# Patient Record
Sex: MALE | Race: AMERICAN INDIAN OR ALASKA NATIVE | URBAN - METROPOLITAN AREA
[De-identification: names, ages, dates, MRNs, and addresses within clinical notes are randomized per-mention and may not be internally consistent; named-entity substitution may affect disease eponyms.]

---

## 2017-02-02 ENCOUNTER — OFFICE VISIT (OUTPATIENT)
Dept: FAMILY MEDICINE CLINIC | Age: 57
End: 2017-02-02

## 2017-02-02 ENCOUNTER — HOSPITAL ENCOUNTER (OUTPATIENT)
Dept: LAB | Age: 57
Discharge: HOME | End: 2017-02-02

## 2017-02-02 ENCOUNTER — TELEPHONE (OUTPATIENT)
Dept: FAMILY MEDICINE CLINIC | Age: 57
End: 2017-02-02

## 2017-02-02 VITALS
BODY MASS INDEX: 18.61 KG/M2 | RESPIRATION RATE: 18 BRPM | TEMPERATURE: 96.8 F | SYSTOLIC BLOOD PRESSURE: 132 MMHG | OXYGEN SATURATION: 98 % | WEIGHT: 105 LBS | HEART RATE: 64 BPM | DIASTOLIC BLOOD PRESSURE: 83 MMHG | HEIGHT: 63 IN

## 2017-02-02 DIAGNOSIS — G89.29 CHRONIC LEFT-SIDED LOW BACK PAIN WITH LEFT-SIDED SCIATICA: ICD-10-CM

## 2017-02-02 DIAGNOSIS — R20.2 NUMBNESS AND TINGLING IN LEFT ARM: ICD-10-CM

## 2017-02-02 DIAGNOSIS — M54.10 RADICULOPATHY OF LEG: ICD-10-CM

## 2017-02-02 DIAGNOSIS — M79.642 LEFT HAND PAIN: ICD-10-CM

## 2017-02-02 DIAGNOSIS — R20.2 PARESTHESIA OF LEFT ARM: Primary | ICD-10-CM

## 2017-02-02 DIAGNOSIS — G25.2 INTENTION TREMOR: ICD-10-CM

## 2017-02-02 DIAGNOSIS — R63.4 WEIGHT LOSS, UNINTENTIONAL: ICD-10-CM

## 2017-02-02 DIAGNOSIS — M54.42 CHRONIC LEFT-SIDED LOW BACK PAIN WITH LEFT-SIDED SCIATICA: ICD-10-CM

## 2017-02-02 DIAGNOSIS — R20.0 NUMBNESS AND TINGLING IN LEFT ARM: ICD-10-CM

## 2017-02-02 DIAGNOSIS — Z11.59 NEED FOR HEPATITIS C SCREENING TEST: ICD-10-CM

## 2017-02-02 DIAGNOSIS — M62.81 MUSCLE WEAKNESS (GENERALIZED): ICD-10-CM

## 2017-02-02 PROCEDURE — 99001 SPECIMEN HANDLING PT-LAB: CPT | Performed by: FAMILY MEDICINE

## 2017-02-02 RX ORDER — BUDESONIDE AND FORMOTEROL FUMARATE DIHYDRATE 160; 4.5 UG/1; UG/1
1 AEROSOL RESPIRATORY (INHALATION) DAILY
Refills: 1 | COMMUNITY
Start: 2016-12-27 | End: 2017-05-09 | Stop reason: SDUPTHER

## 2017-02-02 RX ORDER — GABAPENTIN 300 MG/1
300 CAPSULE ORAL 3 TIMES DAILY
Qty: 90 CAP | Refills: 2 | Status: SHIPPED | OUTPATIENT
Start: 2017-02-02 | End: 2017-02-09 | Stop reason: SINTOL

## 2017-02-02 RX ORDER — CLONIDINE HYDROCHLORIDE 0.2 MG/1
1 TABLET ORAL DAILY
Refills: 0 | COMMUNITY
Start: 2017-01-06 | End: 2017-06-03 | Stop reason: SDUPTHER

## 2017-02-02 RX ORDER — ALBUTEROL SULFATE 90 UG/1
1-2 AEROSOL, METERED RESPIRATORY (INHALATION)
Refills: 1 | COMMUNITY
Start: 2017-01-25 | End: 2017-05-09

## 2017-02-02 RX ORDER — CARISOPRODOL 250 MG/1
250 TABLET ORAL
Qty: 90 TAB | Refills: 2 | Status: SHIPPED | OUTPATIENT
Start: 2017-02-02 | End: 2017-02-06 | Stop reason: CLARIF

## 2017-02-02 NOTE — TELEPHONE ENCOUNTER
Called pharmacy to see what was going on they stated it was not gabapentin it was the carisoprodol (SOMA) 250 mg tablet that insurance will not cover. Received fax for prior auth put in box.
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
## 2017-02-02 NOTE — TELEPHONE ENCOUNTER
Pt called stating he took his Gabapentin rx to the pharmacy and was told this insurance denied it. Pt called his insurance ([employer]) to find out why. Insurance told the pt that if the doctor calls them ([phone]) they will approve it.

## 2017-02-02 NOTE — MR AVS SNAPSHOT
Visit Information Date & Time Provider Department Dept. Phone Encounter #  
 2/2/2017  7:00 AM Bruna Ty, 3 [facility] [phone] [id number] Follow-up Instructions Return in about 4 weeks (around 3/2/2017) for 30min sports med f/u. Upcoming Health Maintenance Date Due Hepatitis C Screening 1960 FOBT Q 1 YEAR AGE 50-75 9/28/2008 INFLUENZA AGE 9 TO ADULT 8/1/2016 DTaP/Tdap/Td series (2 - Td) 11/21/2025 Allergies as of 2/2/2017  Review Complete On: 2/2/2017 By: Bruna Ty MD  
 No Known Allergies Current Immunizations  Reviewed on 2/2/2017 Name Date Influenza Vaccine 12/14/2014 Pneumococcal Polysaccharide (PPSV-23) 1/8/2015 Tdap 11/21/2015 Reviewed by Pavithra Parkinson LPN on 6/0/6181 at  6:53 AM  
You Were Diagnosed With   
  
 Codes Comments Muscle weakness (generalized)    -  Primary ICD-10-CM: M62.81 ICD-9-CM: 728.87 Paresthesia of left arm     ICD-10-CM: R20.2 ICD-9-CM: 782.0 Radiculopathy of leg     ICD-10-CM: M54.10 ICD-9-CM: 724.4 Weight loss, unintentional     ICD-10-CM: R63.4 ICD-9-CM: 783.21 Chronic left-sided low back pain with left-sided sciatica     ICD-10-CM: M54.42, G89.29 ICD-9-CM: 724.2, 724.3, 338.29 Left hand pain     ICD-10-CM: Z46.339 ICD-9-CM: 729.5 Intention tremor     ICD-10-CM: G25.2 ICD-9-CM: 333.1 Need for hepatitis C screening test     ICD-10-CM: Z11.59 
ICD-9-CM: V73.89 Vitals BP Pulse Temp Resp Height(growth percentile) Weight(growth percentile) 132/83 (BP 1 Location: Right arm, BP Patient Position: Sitting) 64 96.8 °F (36 °C) (Oral) 18 5' 3\" (1.6 m) 105 lb (47.6 kg) SpO2 BMI Smoking Status 98% 18.6 kg/m2 Former Smoker Vitals History BMI and BSA Data Body Mass Index Body Surface Area  
 18.6 kg/m 2 1.45 m 2 Preferred Pharmacy Pharmacy Name Phone [facility] [address] [phone] Your Updated Medication List  
  
   
This list is accurate as of: 2/2/17  8:21 AM.  Always use your most recent med list.  
  
  
  
  
 carisoprodol 250 mg tablet Commonly known as:  SOMA Take 1 Tab by mouth every eight (8) hours as needed for Muscle Spasm(s). Max Daily Amount: 750 mg.  
  
 cloNIDine HCl 0.2 mg tablet Commonly known as:  CATAPRES Take 1 Tab by mouth two (2) times a day.  
  
 gabapentin 300 mg capsule Commonly known as:  NEURONTIN Take 1 Cap by mouth three (3) times daily. SYMBICORT 160-4.5 mcg/actuation HFA inhaler Generic drug:  budesonide-formoterol Take 1 Puff by inhalation daily. VENTOLIN HFA 90 mcg/actuation inhaler Generic drug:  albuterol Take 1-2 Puffs by inhalation every four (4) hours as needed. Prescriptions Printed Refills  
 carisoprodol (SOMA) 250 mg tablet 2 Sig: Take 1 Tab by mouth every eight (8) hours as needed for Muscle Spasm(s). Max Daily Amount: 750 mg.  
 Class: Print Route: Oral  
  
Prescriptions Sent to Pharmacy Refills  
 gabapentin (NEURONTIN) 300 mg capsule 2 Sig: Take 1 Cap by mouth three (3) times daily. Class: Normal  
 Pharmacy: [facility] [address] [address]  #: [phone] Route: Oral  
  
Follow-up Instructions Return in about 4 weeks (around 3/2/2017) for 30min sports med f/u. To-Do List   
 02/02/2017 Lab:  AMMONIA   
  
 02/02/2017 Lab:  CBC WITH AUTOMATED DIFF   
  
 02/02/2017 Lab:  FERRITIN   
  
 02/02/2017 Lab:  HEAVY METALS PROFILE, UR   
  
 02/02/2017 Lab:  HEPATITIS C AB   
  
 02/02/2017 Lab:  HEPATITIS C AB   
  
 02/02/2017 Lab:  HIV 1/2 AG/AB, 4TH GENERATION,W RFLX CONFIRM   
  
 02/02/2017 Lab:  IRON PROFILE   
  
 02/02/2017   Lab:  METABOLIC PANEL, COMPREHENSIVE   
  
 02/02/2017 Imaging:  MRI CERV SPINE WO CONT   
  
 02/02/2017 Imaging:  MRI LUMB SPINE WO CONT   
  
 02/02/2017 Lab:  PREALBUMIN   
  
 02/02/2017 Lab:  T PALLIDUM AB   
  
 02/02/2017 Lab:  T4, FREE   
  
 02/02/2017 Lab:  TSH 3RD GENERATION   
  
 02/02/2017 Lab:  VITAMIN B12 & FOLATE   
  
 02/02/2017 Lab:  VITAMIN D, 25 HYDROXY   
  
 02/02/2017 Imaging:  XR HAND LT AP/LAT Patient Instructions To Do (in the office): · Stop by the  on your way out · Tell them you need to establish care with a provider for primary care · Tell them you need to get bloodwork today as well To Do (after the office): · Bring your hand x-ray order to any [facility] facility to get your hand xray done · Restart your Merrianne Bulmaro (carisoprodol) 3 times a day as needed for muscle spasms · Start gabapentin (neurontin) 300mg at night for the nerve pain; then slowly increase to gabapentin 300mg 3 times a day Notes from your doctor: - I am checking bloodwork to see what can be causing your nerve pain, tremors - [facility] central scheduling should call you within the 3-5 days to schedule your neck and low back MRI. However, if you have not received a phone call from them in 3 business days, call them @ [phone].   
- at some point, depending on your results, you may need a referral to a neurologist for nerve studies - my goal is to find the reason why you are in so much pain, and to find the appropriate long-term treatment for your pain - please remember that I am NOT a pain management doctor, so I do not prescribe long-term narcotic medication. .. But narcotic pain medication only covers up the pain anyway. .. It is NOT the long-term solution. Abnormal Weight Loss: Care Instructions Your Care Instructions There are two types of weight lossnormal and abnormal. The normal kind happens when you are trying to lose weight by exercising more or eating less. The abnormal kind happens when you are not trying to lose weight. Many medical problems can cause abnormal weight loss. These include problems with your thyroid gland, long-term infections, mouth or throat problems that make it hard to eat, and digestive problems. They also include depression and cancer. Some medicines also may cause you to lose weight. You can work with your doctor to find the cause of your weight loss. You will probably need tests to do this. Follow-up care is a key part of your treatment and safety. Be sure to make and go to all appointments, and call your doctor if you are having problems. It's also a good idea to know your test results and keep a list of the medicines you take. How can you care for yourself at home? · Weigh yourself at the same time every day. It's best to do it first thing in the morning after you empty your bladder. Be sure to always wear the same amount of clothing. · Write down any changes in your weight and the possible causes. Discuss these with your doctor. · Your doctor may want you to change your diet. Do your best to follow his or her advice. · Ask your doctor if you should see a dietitian. This is a person who can help you plan meals that work best for your lifestyle. · Note any changes in bowel habits. These may include changes in how often you have a bowel movement. Other changes include the color and size of your stools and how solid they are. · If you are prescribed medicines, take them exactly as prescribed. Call your doctor if you think you are having a problem with your medicine. You will get more details on the specific medicines your doctor prescribes. When should you call for help? Watch closely for changes in your health, and be sure to contact your doctor if: 
· You do not get better as expected. · You continue to lose weight. Where can you learn more? Go to http://eileen-crystal.info/. Enter A790 in the search box to learn more about \"Abnormal Weight Loss: Care Instructions. \" Current as of: February 16, 2016 Content Version: 11.1 © 6841-7771 [employer]. Care instructions adapted under license by [employer] (which disclaims liability or warranty for this information). If you have questions about a medical condition or this instruction, always ask your healthcare professional. [address] any warranty or liability for your use of this information. Introducing [facility]! Shilpa Singleton introduces Thoora patient portal. Now you can access parts of your medical record, email your doctor's office, and request medication refills online. 1. In your internet browser, go to [email or web address] 2. Click on the First Time User? Click Here link in the Sign In box. You will see the New Member Sign Up page. 3. Enter your Thoora Access Code exactly as it appears below. You will not need to use this code after youve completed the sign-up process. If you do not sign up before the expiration date, you must request a new code. · Thoora Access Code: [email or web address] Expires: 5/3/2017  7:44 AM 
 
4. Enter the last four digits of your Social Security Number (xxxx) and Date of Birth (mm/dd/yyyy) as indicated and click Submit. You will be taken to the next sign-up page. 5. Create a Thoora ID. This will be your Thoora login ID and cannot be changed, so think of one that is secure and easy to remember. 6. Create a Thoora password. You can change your password at any time. 7. Enter your Password Reset Question and Answer. This can be used at a later time if you forget your password. 8. Enter your e-mail address. You will receive e-mail notification when new information is available in [address] 9. Click Sign Up. You can now view and download portions of your medical record. 10. Click the Download Summary menu link to download a portable copy of your medical information. If you have questions, please visit the Frequently Asked Questions section of the Rad website. Remember, Rad is NOT to be used for urgent needs. For medical emergencies, dial 911. Now available from your iPhone and Android! Please provide this summary of care documentation to your next provider. If you have any questions after today's visit, please call [phone].

## 2017-02-02 NOTE — PATIENT INSTRUCTIONS
To Do (in the office):  · Stop by the  on your way out  · Tell them you need to establish care with a provider for primary care  · Tell them you need to get bloodwork today as well      To Do (after the office):  · Bring your hand x-ray order to any [facility] facility to get your hand xray done  · Restart your Soma (carisoprodol) 3 times a day as needed for muscle spasms  · Start gabapentin (neurontin) 300mg at night for the nerve pain; then slowly increase to gabapentin 300mg 3 times a day      Notes from your doctor:  - I am checking bloodwork to see what can be causing your nerve pain, tremors  - [facility] central scheduling should call you within the 3-5 days to schedule your neck and low back MRI. However, if you have not received a phone call from them in 3 business days, call them @ [phone].    - at some point, depending on your results, you may need a referral to a neurologist for nerve studies  - my goal is to find the reason why you are in so much pain, and to find the appropriate long-term treatment for your pain  - please remember that I am NOT a pain management doctor, so I do not prescribe long-term narcotic medication. .. But narcotic pain medication only covers up the pain anyway. .. It is NOT the long-term solution. Abnormal Weight Loss: Care Instructions  Your Care Instructions  There are two types of weight lossnormal and abnormal. The normal kind happens when you are trying to lose weight by exercising more or eating less. The abnormal kind happens when you are not trying to lose weight. Many medical problems can cause abnormal weight loss. These include problems with your thyroid gland, long-term infections, mouth or throat problems that make it hard to eat, and digestive problems. They also include depression and cancer. Some medicines also may cause you to lose weight. You can work with your doctor to find the cause of your weight loss.  You will probably need tests to do this. Follow-up care is a key part of your treatment and safety. Be sure to make and go to all appointments, and call your doctor if you are having problems. It's also a good idea to know your test results and keep a list of the medicines you take. How can you care for yourself at home? · Weigh yourself at the same time every day. It's best to do it first thing in the morning after you empty your bladder. Be sure to always wear the same amount of clothing. · Write down any changes in your weight and the possible causes. Discuss these with your doctor. · Your doctor may want you to change your diet. Do your best to follow his or her advice. · Ask your doctor if you should see a dietitian. This is a person who can help you plan meals that work best for your lifestyle. · Note any changes in bowel habits. These may include changes in how often you have a bowel movement. Other changes include the color and size of your stools and how solid they are. · If you are prescribed medicines, take them exactly as prescribed. Call your doctor if you think you are having a problem with your medicine. You will get more details on the specific medicines your doctor prescribes. When should you call for help? Watch closely for changes in your health, and be sure to contact your doctor if:  · You do not get better as expected. · You continue to lose weight. Where can you learn more? Go to http://eileen-crystal.info/. Enter A790 in the search box to learn more about \"Abnormal Weight Loss: Care Instructions. \"  Current as of: February 16, 2016  Content Version: 11.1  © 6659-5789 Healthwise, Incorporated. Care instructions adapted under license by [employer] (which disclaims liability or warranty for this information).  If you have questions about a medical condition or this instruction, always ask your healthcare professional. [address] any warranty or liability for your use of this information.

## 2017-02-02 NOTE — PROGRESS NOTES
Ritchie Estevez is a 60 y.o. male here for back pain and left hand pain, patient states he fell on hand x 2 months ago and now can barely move it. 1. Have you been to the ER, urgent care clinic or hospitalized since your last visit? NO.     2. Have you seen or consulted any other health care providers outside of the [facility] since your last visit (Include any pap smears or colon screening)? NO      Do you have an Advanced Directive? NO    Would you like information on Advanced Directives?  NO    Learning Assessment 2/2/2017   PRIMARY LEARNER Patient   HIGHEST LEVEL OF EDUCATION - PRIMARY LEARNER  2 YEARS OF COLLEGE   BARRIERS PRIMARY LEARNER NONE   CO-LEARNER CAREGIVER No   PRIMARY LANGUAGE ENGLISH   LEARNER PREFERENCE PRIMARY READING     LISTENING     DEMONSTRATION     VIDEOS   ANSWERED BY self   RELATIONSHIP SELF

## 2017-02-03 NOTE — TELEPHONE ENCOUNTER
Received denial from W. RGraciela Brown re: carisoprodol. Please call Sahara Angel and ask him if he has been on any other muscle relaxants ever, other than this one. If not, we will have to try others before his insurance will pay for Soma (carisoprodol), and I will go ahead an send one in to his pharmacy.     Thanks

## 2017-02-04 PROBLEM — T88.7XXA MEDICATION SIDE EFFECT: Status: ACTIVE | Noted: 2017-02-04

## 2017-02-04 LAB
25(OH)D3+25(OH)D2 SERPL-MCNC: 18.9 NG/ML (ref 30–100)
ALBUMIN SERPL-MCNC: 4.3 G/DL (ref 3.5–5.5)
ALBUMIN/GLOB SERPL: 2 {RATIO} (ref 1.1–2.5)
ALP SERPL-CCNC: 59 IU/L (ref 39–117)
ALT SERPL-CCNC: 11 IU/L (ref 0–44)
AMMONIA PLAS-MCNC: 28 UG/DL (ref 27–102)
ARSENIC 24H UR-MCNC: NORMAL UG/L (ref 0–50)
AST SERPL-CCNC: 14 IU/L (ref 0–40)
BASOPHILS # BLD AUTO: 0.1 X10E3/UL (ref 0–0.2)
BASOPHILS NFR BLD AUTO: 1 %
BILIRUB SERPL-MCNC: 0.3 MG/DL (ref 0–1.2)
BUN SERPL-MCNC: 17 MG/DL (ref 6–24)
BUN/CREAT SERPL: 18 (ref 9–20)
CALCIUM SERPL-MCNC: 9.1 MG/DL (ref 8.7–10.2)
CHLORIDE SERPL-SCNC: 101 MMOL/L (ref 96–106)
CO2 SERPL-SCNC: 21 MMOL/L (ref 18–29)
CREAT SERPL-MCNC: 0.95 MG/DL (ref 0.76–1.27)
CREAT UR-MCNC: 0.34 G/L (ref 0.3–3)
EOSINOPHIL # BLD AUTO: 0.3 X10E3/UL (ref 0–0.4)
EOSINOPHIL NFR BLD AUTO: 4 %
ERYTHROCYTE [DISTWIDTH] IN BLOOD BY AUTOMATED COUNT: 13.8 % (ref 12.3–15.4)
FERRITIN SERPL-MCNC: 289 NG/ML (ref 30–400)
FOLATE SERPL-MCNC: 7.4 NG/ML
GLOBULIN SER CALC-MCNC: 2.2 G/DL (ref 1.5–4.5)
GLUCOSE SERPL-MCNC: 100 MG/DL (ref 65–99)
HCT VFR BLD AUTO: 41.4 % (ref 37.5–51)
HCV AB S/CO SERPL IA: <0.1 S/CO RATIO (ref 0–0.9)
HGB BLD-MCNC: 15 G/DL (ref 12.6–17.7)
HIV 1+2 AB+HIV1 P24 AG SERPL QL IA: NON REACTIVE
IMM GRANULOCYTES # BLD: 0 X10E3/UL (ref 0–0.1)
IMM GRANULOCYTES NFR BLD: 0 %
INORG ARSENIC UR-MCNC: NORMAL UG/L (ref 0–19)
IRON SATN MFR SERPL: 43 % (ref 15–55)
IRON SERPL-MCNC: 118 UG/DL (ref 38–169)
LEAD 24H UR-MCNC: NORMAL UG/L (ref 0–49)
LYMPHOCYTES # BLD AUTO: 1.7 X10E3/UL (ref 0.7–3.1)
LYMPHOCYTES NFR BLD AUTO: 27 %
MCH RBC QN AUTO: 35.6 PG (ref 26.6–33)
MCHC RBC AUTO-ENTMCNC: 36.2 G/DL (ref 31.5–35.7)
MCV RBC AUTO: 98 FL (ref 79–97)
MERCURY 24H UR-MCNC: NORMAL UG/L (ref 0–19)
MONOCYTES # BLD AUTO: 0.6 X10E3/UL (ref 0.1–0.9)
MONOCYTES NFR BLD AUTO: 10 %
NEUTROPHILS # BLD AUTO: 3.6 X10E3/UL (ref 1.4–7)
NEUTROPHILS NFR BLD AUTO: 58 %
PLATELET # BLD AUTO: 118 X10E3/UL (ref 150–379)
POTASSIUM SERPL-SCNC: 5 MMOL/L (ref 3.5–5.2)
PREALB SERPL-MCNC: 28 MG/DL (ref 10–36)
PROT SERPL-MCNC: 6.5 G/DL (ref 6–8.5)
RBC # BLD AUTO: 4.21 X10E6/UL (ref 4.14–5.8)
SODIUM SERPL-SCNC: 138 MMOL/L (ref 134–144)
T PALLIDUM AB SER QL IA: NEGATIVE
T4 FREE SERPL-MCNC: 1.18 NG/DL (ref 0.82–1.77)
TIBC SERPL-MCNC: 275 UG/DL (ref 250–450)
TSH SERPL DL<=0.005 MIU/L-ACNC: 2.17 UIU/ML (ref 0.45–4.5)
UIBC SERPL-MCNC: 157 UG/DL (ref 111–343)
VIT B12 SERPL-MCNC: 392 PG/ML (ref 211–946)
WBC # BLD AUTO: 6.2 X10E3/UL (ref 3.4–10.8)

## 2017-02-06 RX ORDER — BACLOFEN 10 MG/1
10 TABLET ORAL 3 TIMES DAILY
Qty: 90 TAB | Refills: 1 | Status: SHIPPED | OUTPATIENT
Start: 2017-02-06 | End: 2017-02-09

## 2017-02-06 NOTE — TELEPHONE ENCOUNTER
Spoke to patient he stated he has tried flexeril before in the past and it didn't help. He states the pharmacy did give him 15 tablets of the Soma when he went and the 250 mg did help some the tremors  a little. Patient did inform me that he had to stop taking the Neurontin because he gave him dizziness, he couldn't walk, migraine in the left eye and nausea and vomiting.

## 2017-02-06 NOTE — TELEPHONE ENCOUNTER
Orders Placed This Encounter    baclofen (LIORESAL) 10 mg tablet     Sig: Take 1 Tab by mouth three (3) times daily. Dispense:  90 Tab     Refill:  1     Please have him start with one before bed, and if it doesn't make him too sleepy. .. He can increase slowly to baclofen 10mg twice daily. .. And then to three times a day. Also please clarify how he was taking his neurontin. .. Did he get those side effects with just one pill (300mg) before bed?

## 2017-02-09 ENCOUNTER — TELEPHONE (OUTPATIENT)
Dept: FAMILY MEDICINE CLINIC | Age: 57
End: 2017-02-09

## 2017-02-09 ENCOUNTER — OFFICE VISIT (OUTPATIENT)
Dept: FAMILY MEDICINE CLINIC | Age: 57
End: 2017-02-09

## 2017-02-09 VITALS
HEART RATE: 71 BPM | RESPIRATION RATE: 16 BRPM | OXYGEN SATURATION: 98 % | DIASTOLIC BLOOD PRESSURE: 90 MMHG | WEIGHT: 102 LBS | SYSTOLIC BLOOD PRESSURE: 140 MMHG | BODY MASS INDEX: 18.07 KG/M2 | HEIGHT: 63 IN | TEMPERATURE: 97.7 F

## 2017-02-09 DIAGNOSIS — M79.642 LEFT HAND PAIN: Primary | ICD-10-CM

## 2017-02-09 RX ORDER — METHOCARBAMOL 750 MG/1
750 TABLET, FILM COATED ORAL 4 TIMES DAILY
Qty: 90 TAB | Refills: 0 | Status: SHIPPED | OUTPATIENT
Start: 2017-02-09 | End: 2017-02-14

## 2017-02-09 NOTE — PROGRESS NOTES
Ofelia Vargas is a 60 y.o. male is here today to go over xray results. 1. Have you been to the ER, urgent care clinic or hospitalized since your last visit? NO.     2. Have you seen or consulted any other health care providers outside of the [address] [place] [address] since your last visit (Include any pap smears or colon screening)? NO      Do you have an Advanced Directive? NO    Would you like information on Advanced Directives?  NO    Learning Assessment 2/2/2017   PRIMARY LEARNER Patient   HIGHEST LEVEL OF EDUCATION - PRIMARY LEARNER  2 YEARS OF COLLEGE   BARRIERS PRIMARY LEARNER NONE   CO-LEARNER CAREGIVER No   PRIMARY LANGUAGE ENGLISH   LEARNER PREFERENCE PRIMARY READING     LISTENING     DEMONSTRATION     VIDEOS   ANSWERED BY self   RELATIONSHIP SELF

## 2017-02-09 NOTE — TELEPHONE ENCOUNTER
Formulary alternatives for Soma 250mg:  - baclofen 10mg, 20mg  - chlorzoxazone 500mg  - cyclobenzaprine 5mg, 7.5mg, 10mg  - metaxalone 800mg  - methocarbamol 500mg, 750mg  - orphenadrine SR 100mg  - tizanidine 2mg, 4mg

## 2017-02-09 NOTE — TELEPHONE ENCOUNTER
\"this other medicine is helping a little bit, but not as much as the 250mg soma\"  [employer] are liars, they told me that they haven't received any paperwork from you and that is why I can't get my soma. \"    Kiesha Standing the insurance requirement of trying & failing 2 other muscle relaxants of the same class. He voiced understanding an appreciation of our office's time. Orders Placed This Encounter    methocarbamol (ROBAXIN) 750 mg tablet     Sig: Take 1 Tab by mouth four (4) times daily. Dispense:  90 Tab     Refill:  0       Will inform us how this muscle relaxant fares for him in the coming days.

## 2017-02-09 NOTE — PROGRESS NOTES
Patient presents for review of left hand xray which is normal s/p fall. He is relieved to hear the xray results. EXAM: XR HAND LT AP/LAT     INDICATION: 3rd MCP pain s/p fall     COMPARISON: None.     FINDINGS: 2 views of the left hand demonstrate no fracture or other osseous,  articular or soft tissue abnormality.      IMPRESSION  IMPRESSION: No acute osseous abnormality identified.       Imaging      XR HAND LT AP/LAT (Order #[id number]) on 2/6/2017 - Imaging Information     He was frustrated with his insurance not covering Risperidol and plans to change insurances today or tomorrow. He would like to sign up with an insurance that will cover this medication prescription. Past Medical History   Diagnosis Date    Allergic rhinitis     Allergic rhinitis     Arthropathy, unspecified, site unspecified     Chronic low back pain     [facility] obstructive pulmonary disease) [facility]     COPD with acute exacerbation [facility]     Depression     Digestive disorder     Dyspnea     GERD (gastroesophageal reflux disease)     Hypertension     Malnutrition ([facility])     Nocturia     Snoring     Tremor     Unspecified cerebral artery occlusion with cerebral infarction ([facility])     Weight decrease     Weight loss, abnormal        Current Outpatient Prescriptions:     baclofen (LIORESAL) 10 mg tablet, Take 1 Tab by mouth three (3) times daily. , Disp: 90 Tab, Rfl: 1    VENTOLIN HFA 90 mcg/actuation inhaler, Take 1-2 Puffs by inhalation every four (4) hours as needed. , Disp: , Rfl: 1    SYMBICORT 160-4.5 mcg/actuation HFA inhaler, Take 1 Puff by inhalation daily. , Disp: , Rfl: 1    cloNIDine HCl (CATAPRES) 0.2 mg tablet, Take 1 Tab by mouth two (2) times a day., Disp: , Rfl: 0    gabapentin (NEURONTIN) 300 mg capsule, Take 1 Cap by mouth three (3) times daily. , Disp: 90 Cap, Rfl: 2    ROS: 13 system review otherwise negative. PE: patient was not interested in an examination today.     Aseessment/Plan:  -Left hand pain s/p fall; no fracture per xray. -Patient to remain on all medications as prescribed.

## 2017-02-09 NOTE — TELEPHONE ENCOUNTER
Patient called and states that he talked to his insurance company they stated we need to call to get prior auth to have Vianca approved informed him we did that and they denied it stating he must try other muscle relaxants. He went on to say I need to call them because they said they never received paperwork from us and i need to talk to nurse management. I told him that is why we called in the baclofen which he already picked up per pharmacy when I just spoke with them. He said he started taking the Baclofen on Tuesday morning and he doesn't work. He kept arguing telling me I need to call and they will approve medication Soma. The soma 350 mg is what works and once we get it approved he wants it for 350 mg not 250 mg. Because they did give him a emergency supply of Soma 250 mg when we first prescribed it of 15 pills and his shaking has decreased a great deal and he knows the 350 mg will work great since he has been on that before. Call insurance company again and confirmed that insurance company did Denied  prior Nicaraa for soma. Please advise.

## 2017-02-12 PROBLEM — E55.9 VITAMIN D DEFICIENCY: Status: ACTIVE | Noted: 2017-02-12

## 2017-02-12 PROBLEM — D75.89 MACROCYTOSIS WITHOUT ANEMIA: Status: ACTIVE | Noted: 2017-02-12

## 2017-02-13 ENCOUNTER — TELEPHONE (OUTPATIENT)
Dept: FAMILY MEDICINE CLINIC | Age: 57
End: 2017-02-13

## 2017-02-13 DIAGNOSIS — G89.29 CHRONIC LEFT-SIDED LOW BACK PAIN WITH LEFT-SIDED SCIATICA: ICD-10-CM

## 2017-02-13 DIAGNOSIS — M54.42 CHRONIC LEFT-SIDED LOW BACK PAIN WITH LEFT-SIDED SCIATICA: ICD-10-CM

## 2017-02-13 NOTE — TELEPHONE ENCOUNTER
Pt called stating new rx   methocarbamol (ROBAXIN) 750 mg tablet   750 mg, 4 TIMES DAILY     \"isn't doing nothing\" and would like a new medication

## 2017-02-14 ENCOUNTER — TELEPHONE (OUTPATIENT)
Dept: FAMILY MEDICINE CLINIC | Age: 57
End: 2017-02-14

## 2017-02-14 RX ORDER — CARISOPRODOL 250 MG/1
250 TABLET ORAL
Qty: 90 TAB | Refills: 2
Start: 2017-02-14 | End: 2019-06-04

## 2017-02-14 NOTE — TELEPHONE ENCOUNTER
Please start 2nd prior authorization for Soma (carisoprodol). Orders Placed This Encounter    carisoprodol (SOMA) 250 mg tablet     Sig: Take 1 Tab by mouth every eight (8) hours as needed for Muscle Spasm(s). Max Daily Amount: 750 mg. Dispense:  90 Tab [NO PRINT since has Rx on file at pharmacy]     Refill:  2     Thanks.

## 2017-02-14 NOTE — TELEPHONE ENCOUNTER
Spoke to [employer] they state an appeal needs to be done they did fax over form to be filled out. Put in Dr. Norman Angry box.

## 2017-02-15 NOTE — TELEPHONE ENCOUNTER
Pt called again in regards to the rx he states his ins company just called him and they wanted him to ask Dr Shannen Moran to write a letter or give them documentation backing the reasoning for him needing this medication. Pt would like a call back once said documentation is written and faxed over to them.

## 2017-02-16 ENCOUNTER — TELEPHONE (OUTPATIENT)
Dept: FAMILY MEDICINE CLINIC | Age: 57
End: 2017-02-16
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
## 2017-02-16 NOTE — TELEPHONE ENCOUNTER
\"I'm tired and sore. I plan on changing my (insurance) provider (in May 2017). I went to medicaid to complain about them today. Because of them not getting my authorization for my MRI, I lost my spot today. I think they only authorized my MRI for next week because I complained about them\"    Ok Raphael continued to struggle to stay on topic. I asked him several times what was his preference re: his carsiprodol. .. To either wait to hear what his insurance says re: the appeal, or to try another muscle relaxant off their formulary. After asking him a 3rd time, he stated that he feels as though it is easier to wait to hear re: the appeal, because he is fearful of the many side effects he gets \"from all these drugs, the vomiting, the weakness. \"    Asked if there were any other questions or concerns he had, and he stated he had none. He ended the call before I could ask him if he needed a stool softener, as mentioned in the message from prior.

## 2017-02-16 NOTE — TELEPHONE ENCOUNTER
Please give patient a call; he is not upset with us he is upset with his ins company; pt is supposed to be having an MRI done today at 5 however Camelia Ballesteros told him there was no order for his MRI. Pt is upset and wants his MRI done today at [facility] and he wants to know about his medication since he has not been sleeping at night because his ins messed up his medication.  Please give him a call back

## 2017-02-22 ENCOUNTER — TELEPHONE (OUTPATIENT)
Dept: FAMILY MEDICINE CLINIC | Age: 57
End: 2017-02-22

## 2017-02-22 NOTE — TELEPHONE ENCOUNTER
Pt wanted to let Dr Stephanie Rivera know that the rx carisoprodol finally came through and he started it last night and that his shaking is still there but there are not as sporadic as before. He  wanted to let him know that he had some congestion since last week but he has been using his breathing machine and it has been getting better with that. Also he had to get his MRI r/s to tomorrow at 8:00pm at [facility].

## 2017-02-24 PROBLEM — R93.0: Status: ACTIVE | Noted: 2017-02-24

## 2017-02-24 PROBLEM — M48.02 FORAMINAL STENOSIS OF CERVICAL REGION: Status: ACTIVE | Noted: 2017-02-24

## 2017-02-28 DIAGNOSIS — M54.42 CHRONIC LEFT-SIDED LOW BACK PAIN WITH LEFT-SIDED SCIATICA: ICD-10-CM

## 2017-02-28 DIAGNOSIS — G89.29 CHRONIC LEFT-SIDED LOW BACK PAIN WITH LEFT-SIDED SCIATICA: ICD-10-CM

## 2017-02-28 DIAGNOSIS — R20.2 PARESTHESIA OF LEFT ARM: ICD-10-CM

## 2017-02-28 DIAGNOSIS — M54.10 RADICULOPATHY OF LEG: ICD-10-CM

## 2017-03-02 ENCOUNTER — OFFICE VISIT (OUTPATIENT)
Dept: FAMILY MEDICINE CLINIC | Age: 57
End: 2017-03-02

## 2017-03-02 VITALS
DIASTOLIC BLOOD PRESSURE: 84 MMHG | HEART RATE: 69 BPM | WEIGHT: 106.2 LBS | TEMPERATURE: 97.1 F | HEIGHT: 63 IN | OXYGEN SATURATION: 98 % | SYSTOLIC BLOOD PRESSURE: 119 MMHG | BODY MASS INDEX: 18.82 KG/M2 | RESPIRATION RATE: 18 BRPM

## 2017-03-02 DIAGNOSIS — J42 CHRONIC BRONCHITIS, UNSPECIFIED CHRONIC BRONCHITIS TYPE (HCC): Chronic | ICD-10-CM

## 2017-03-02 DIAGNOSIS — E55.9 VITAMIN D DEFICIENCY: ICD-10-CM

## 2017-03-02 DIAGNOSIS — D75.89 MACROCYTOSIS WITHOUT ANEMIA: ICD-10-CM

## 2017-03-02 DIAGNOSIS — G89.29 CHRONIC LEFT-SIDED LOW BACK PAIN, WITH SCIATICA PRESENCE UNSPECIFIED: Chronic | ICD-10-CM

## 2017-03-02 DIAGNOSIS — Z23 ENCOUNTER FOR IMMUNIZATION: ICD-10-CM

## 2017-03-02 DIAGNOSIS — M54.5 CHRONIC LEFT-SIDED LOW BACK PAIN, WITH SCIATICA PRESENCE UNSPECIFIED: Chronic | ICD-10-CM

## 2017-03-02 DIAGNOSIS — R93.0 ABNORMAL SKULL OR FACIAL BONE DIAGNOSTIC IMAGING: ICD-10-CM

## 2017-03-02 DIAGNOSIS — R63.4 WEIGHT LOSS, ABNORMAL: ICD-10-CM

## 2017-03-02 DIAGNOSIS — R25.1 TREMOR: Primary | ICD-10-CM

## 2017-03-02 DIAGNOSIS — R63.0 APPETITE LOSS: ICD-10-CM

## 2017-03-02 DIAGNOSIS — M48.02 FORAMINAL STENOSIS OF CERVICAL REGION: ICD-10-CM

## 2017-03-02 RX ORDER — CYANOCOBALAMIN 1000 UG/ML
1000 INJECTION, SOLUTION INTRAMUSCULAR; SUBCUTANEOUS ONCE
Qty: 1 ML | Refills: 0
Start: 2017-03-02 | End: 2017-03-02

## 2017-03-02 RX ORDER — ERGOCALCIFEROL 1.25 MG/1
50000 CAPSULE ORAL
Qty: 24 CAP | Refills: 0 | Status: SHIPPED | OUTPATIENT
Start: 2017-03-03 | End: 2017-05-19 | Stop reason: SDUPTHER

## 2017-03-02 NOTE — PROGRESS NOTES
3 [facility]  Primary Care Office Visit - Problem-Oriented    : 1960   Ofelia Vargas is a 60 y.o. male presenting for  Chief Complaint   Patient presents with    Results     MRI results       Assessment/Plan:     1. Tremor  2. Foraminal stenosis of cervical region  3. Chronic left-sided low back pain, with sciatica presence unspecified  Tremors improved since patient is back on Soma. Unfortunately, his insurance will only approve Soma for less than 1 month's use. Patient was notified of this via telephone call after his visit, once our office was informed of his insurance companies upholding of the an original denial.  Unclear exact etiology of tremor. In addition patient has never had a neurologic consultation. We will also kindly ask neurology to comment regarding severity of radiculopathy from the cervical region, as well as possible etiology and treatment for the low back pain. Considerations: NCS/EMG, Formal physical therapy, TENS unit, other muscle relaxants (however patient failed baclofen & methocarbamol in the recent past)  - REFERRAL TO NEUROLOGY    4. Abnormal skull or facial bone diagnostic imaging  Noted on c-spine MRI.  - CT HEAD FACIAL WO CONT; Future    5. Vitamin D deficiency  New issue. Start high dose repletion. - ergocalciferol (ERGOCALCIFEROL) 50,000 unit capsule; Take 1 Cap by mouth every Tuesday and Friday for 24 doses. Dispense: 24 Cap; Refill: 0    6. Macrocytosis without anemia  With low normal B12. Will give B12 injection today. Future considerations: checking MMA level  - VITAMIN B12 INJECTION ()  - THER/PROPH/DIAG INJ, SC/IM ([id number])  - cyanocobalamin (VITAMIN B-12) 1,000 mcg/mL injection; 1 mL by IntraMUSCular route once for 1 dose. Dispense: 1 mL; Refill: 0    7. Weight loss, abnormal  8. Appetite loss  Ongoing, unclear etiology.   Discussed with Ofelia Vargas that prior to treating his sx with appetite stimulants, we should complete an investigation as to why they are occurring, and he agreed. - REFERRAL TO GASTROENTEROLOGY    9. Chronic bronchitis, unspecified chronic bronchitis type ([facility] [address]  Ongoing, relatively well controlled. I have reviewed & decided to continue symbicort 160/4.5 - 1 puff daily, albuterol prn. Followed by pulmonology. 10. Encounter for immunization  - Influenza virus vaccine (QUADRIVALENT PRES FREE SYRINGE) IM 3 years and older      This document may have been created with the aid of dictation software. Text may contain errors, particularly phonetic errors. Reviewed management plan & instructions with patient, who voiced understanding. Gaurav Chadwick MD  Internal Medicine, Family Medicine & Sports Medicine  3/3/2017, 10:38 AM    Patient Instructions (provided in AVS): To Do:  · Start your high-dose vitamin D to increase your levels back to normal  [facility] central scheduling should call you within the 48 hours to schedule your head CT. However, if you have not received a phone call from them in 3 business days, call them @ [phone]. Your medical testing may require authorization from your insurance company. [facility] [employer] will obtain any needed authorization for you to ensure that it is covered by your insurance. This process normally takes an additional 2 to 5 business days based on your specific insurance. Do NOT schedule your head CT until they can verify that authorization has been obtained FIRST! Notes from your doctor:  · Both gastroenterology & neurology will call you to schedule your appointments with them  · We are looking into your carisoprodol authorization from your insurance company      History:   Niraj Stiles is a 60 y.o. male presenting to address:  Chief Complaint   Patient presents with    Results     MRI results       Patient states that he is doing much better back on the soma. His tremors are less. He notes his pain is overall better.   He continues to have some numbness and tingling in his arms. He continues to be quite concerned about his inability to gain weight, and decreased appetite. He asks specifically for some kind of appetite booster. In addition, he feels as though his inability to gain weight affects his sexual function, and wonders if we can also treat that with medication. Specifically asked patient regarding alcohol and drug use. States that in the past he used to smoke marijuana, however has not done so in many months. Reports that he has never used any IV drugs, or other recreational drugs. He also states that he does not drink at all. Past Medical History:   Diagnosis Date    Allergic rhinitis     Allergic rhinitis     Arthropathy, unspecified, site unspecified     Chronic low back pain     COPD (chronic obstructive pulmonary disease) (HCC)     COPD with acute exacerbation ([facility])     Depression     Digestive disorder     Dyspnea     GERD (gastroesophageal reflux disease)     Hypertension     Malnutrition ([facility])     Nocturia     Snoring     Tremor     Unspecified cerebral artery occlusion with cerebral infarction (HCC)     Weight decrease     Weight loss, abnormal      History reviewed. No pertinent surgical history. reports that he quit smoking about 19 months ago. He has a 22.50 pack-year smoking history. He has never used smokeless tobacco. He reports that he does not drink alcohol or use illicit drugs. Social History     Social History Narrative     History   Smoking Status    Former Smoker    Packs/day: 0.50    Years: 45.00    Quit date: 7/13/2015   Smokeless Tobacco    Never Used     Family History   Problem Relation Age of Onset    Cancer Mother     Other Father      Physical Abuse    Hypertension Brother      No Known Allergies    Problem List:      Patient Active Problem List    Diagnosis    Foraminal stenosis of cervical region     MRI C-spine (2/24/2017):   Moderate C6/7 degenerative disease, with relatively mild DDD at other cervical levels. High grade degenerative neural foramen stenosis C5/6 and C6/7. No significant spinal canal stenosis.  Abnormal skull or facial bone diagnostic imaging     MRI c-spine (2/24/2017): Marrow lesion within clivus is not fully characterized, though it does not demonstrate aggressive features and appears to have been present at the comparison CT. DDx includes arrested pneumatization of the sphenoid sinus, though recommend follow-up skull base CT to confirm stability & exclude more significant pathology.  Macrocytosis without anemia    Vitamin D deficiency    Weight loss, abnormal    Tremor    Hypertension    Chronic bronchitis (HCC)    Chronic low back pain     MRI L-spine (2/23/2017): Mild DDD. Tiny L5/S1 disc protrusion. No canal or foraminal stenosis at any level. Medications:     Current Outpatient Prescriptions   Medication Sig    ergocalciferol (ERGOCALCIFEROL) 50,000 unit capsule Take 1 Cap by mouth every Tuesday and Friday for 24 doses.  carisoprodol (SOMA) 250 mg tablet Take 1 Tab by mouth every eight (8) hours as needed for Muscle Spasm(s). Max Daily Amount: 750 mg.    VENTOLIN HFA 90 mcg/actuation inhaler Take 1-2 Puffs by inhalation every four (4) hours as needed.  SYMBICORT 160-4.5 mcg/actuation HFA inhaler Take 1 Puff by inhalation daily.  cloNIDine HCl (CATAPRES) 0.2 mg tablet Take 1 Tab by mouth two (2) times a day. No current facility-administered medications for this visit.         Review of Systems:     (positives in bold)   Const: fatigue, weight change, appetite change, fevers, chills   Neuro: headaches, vision changes, dizziness, loss of consciousness, burning pain, tingling, numbness   HEENT: itchy eyes, runny eyes, red eyes, eye discharge, vision changes, light sensitivity, ear pain, ear pressure, ear popping, ear discharge/drainage, hearing change,nosebleeds, sneezing, runny nose, nasal congestion,  change in sense of smell, sore throat, voice change or hoarse voice,   dry mouth, toothache, jaw popping, difficulty swallowing, painful swallowing,   oral ulcers or canker sores   CV: chest pain, palpitations, orthopnea, PND   Pulm: dyspnea, wheezing, cough, sputum production, hemoptysis   GI: nausea, vomiting, heartburn, abdominal pain, greasy stools,   blood in stool, diarrhea, constipation   : dysuria, hematuria, change in urine, urinary frequency, urinary urgency   MSK: muscle/joint pain, joint swelling   Derm: rashes, skin changes   Allergy: seasonal allergies, itchy eyes   Heme: easy bleeding/bruising   Psych: Suicidal ideation, homicidal ideation           Physical Assessment:   VS:    Vitals:    03/02/17 1031   BP: 119/84   Pulse: 69   Resp: 18   Temp: 97.1 °F (36.2 °C)   TempSrc: Oral   SpO2: 98%   Weight: 106 lb 3.2 oz (48.2 kg)   Height: 5' 3\" (1.6 m)   PainSc:   6   PainLoc: Back       General:     Thin male, in NAD. Head:      PERRL, EOMI.  MMM, poor dentition, oropharynx otherwise WNL. No facial asymmetry noted. Cardiovasc:     No JVD. RRR, no MRG. Pulses 2+ and symmetric at distal extremities. Pulmonary:     Lungs clear bilaterally. Normal respiratory effort. Abdomen:     Abdomen soft, NT, ND, NAB. No masses or organomegaly. Extremities:     No edema, no TTP bilateral calves. No joint effusions. LEs warm and well-perfused. Neuro:     Alert and oriented, CNs II-XII intact. tremor of BUE. No asterixis. Skin:      No rashes noted. Psych:    pleasant, and conversant. Affect, mood & judgment appropriate.     Recent Labs & Imaging:     Lab Results   Component Value Date/Time    WBC 6.2 02/02/2017 08:54 AM    HGB 15.0 02/02/2017 08:54 AM    HCT 41.4 02/02/2017 08:54 AM    PLATELET 853 51/63/7302 08:54 AM    MCV 98 02/02/2017 08:54 AM     Lab Results   Component Value Date/Time    Sodium 138 02/02/2017 08:54 AM    Potassium 5.0 02/02/2017 08:54 AM    Chloride 101 02/02/2017 08:54 AM    CO2 21 02/02/2017 08:54 AM    Glucose 100 02/02/2017 08:54 AM    BUN 17 02/02/2017 08:54 AM    Creatinine 0.95 02/02/2017 08:54 AM    BUN/Creatinine ratio 18 02/02/2017 08:54 AM    GFR est  02/02/2017 08:54 AM    GFR est non-AA 88 02/02/2017 08:54 AM    Calcium 9.1 02/02/2017 08:54 AM    Bilirubin, total 0.3 02/02/2017 08:54 AM    AST (SGOT) 14 02/02/2017 08:54 AM    Alk.  phosphatase 59 02/02/2017 08:54 AM    Protein, total 6.5 02/02/2017 08:54 AM    Albumin 4.3 02/02/2017 08:54 AM    A-G Ratio 2.0 02/02/2017 08:54 AM    ALT (SGPT) 11 02/02/2017 08:54 AM     Lab Results   Component Value Date/Time    Vitamin B12 392 02/02/2017 08:54 AM    Folate 7.4 02/02/2017 08:54 AM     Lab Results   Component Value Date/Time    Iron 118 02/02/2017 08:54 AM    TIBC 275 02/02/2017 08:54 AM    Iron % saturation 43 02/02/2017 08:54 AM    Ferritin 289 02/02/2017 08:54 AM       Lab Results   Component Value Date/Time    Hep C Virus Ab <0.1 02/02/2017 08:54 AM       2/4/2017  5:39 AM - [facility], [facility] Lab Results In       Component Results      Component Value Flag Ref Range Units Status     Ammonia, Plasma 28  27 - 102 ug/dL Final           2/4/2017  5:39 AM - [facility], [facility] Lab Results In       Component Results      Component Value Flag Ref Range Units Status     HIV SCREEN 4TH GENERATION WRFX Non Reactive  Non Reactive  Final           2/4/2017  5:39 AM - [facility], [facility] Lab Results In       Component Results      Component Value Flag Ref Range Units Status     Treponema pallidum Ab Negative  Negative  Final     Comment:     Due to reagent shortages, an alternative multiplex flow   immunoassay has been implemented for the detection of   Treponema pallidum antibodies.     **Possible results include: Non Reactive (Negative)                                    Equivocal (Equivocal)                                     Reactive (Positive)        2/4/2017  5:39 AM - [facility], [facility] Lab Results In       Component Results      Component Value Flag Ref Range Units Status     VITAMIN D, 25-HYDROXY 18.9 (L) 30.0 - 100.0 ng/mL Final     Comment:     Vitamin D deficiency has been defined by the [facility] practice guideline as a   level of serum 25-OH vitamin D less than 20 ng/mL (1,2). The Endocrine Society went on to further define vitamin D   insufficiency as a level between 21 and 29 ng/mL (2). 1. IOM ([place] of [employer]). 2010. Dietary reference      intakes for calcium and D. [facility]: The     [place]. 2. Dell MF, Telma NARAYANAN, Allison COTA, et al.      Evaluation, treatment, and prevention of vitamin D      deficiency: an Endocrine Society clinical practice      guideline. JCEM. 2011 Jul; 96(7):1911-30.        2/4/2017  5:39 AM - [facility], [facility] Lab Results In       Component Results      Component Value Flag Ref Range Units Status     Creatinine(Crt), urine 0.34  0.30 - 3.00 g/L Final     Comment:                                     Detection Limit = 0.10     Arsenic total, urine None Detected  0 - 50 ug/L Final     Comment:                                     Detection Limit = 10     Arsenic, Inorganic, urine None Detected  0 - 19 ug/L Final     Comment:                              Environmental Exposure:  0-19                             Occupational Exposure:    35                                     Detection Limit = 10        Lead, urine None Detected  0 - 49 ug/L Final     Comment:                                     Detection Limit = 1     Mercury, urine None Detected  0 - 19 ug/L Final     Comment:                                     Detection Limit = 1         MRI c-spine (2/24/2017): Moderate C6/7 degenerative disease, with relatively mild DDD at other cervical levels. High grade degenerative neural foramen stenosis C5/6 and C6/7. No significant spinal canal stenosis.   Marrow lesion within clivus is not fully characterized, though it does not demonstrate aggressive features and appears to have been present at the comparison CT. DDx includes arrested pneumatization of the sphenoid sinus, though recommend follow-up skull base CT to confirm stability & exclude more significant pathology. MRI L-spine (2/23/2017): Mild DDD. Tiny L5/S1 disc protrusion. No canal or foraminal stenosis at any level.

## 2017-03-02 NOTE — PROGRESS NOTES
Charlie Rojas is a 60 y.o. male here for a 4 week follow up. Patient states he had last colonoscopy done about 1.5 years ago at [facility] however I could not see it in system. 1. Have you been to the ER, urgent care clinic or hospitalized since your last visit? NO.     2. Have you seen or consulted any other health care providers outside of the [address] [place] [address] since your last visit (Include any pap smears or colon screening)? NO      Do you have an Advanced Directive? NO    Would you like information on Advanced Directives?  NO      Learning Assessment 2/2/2017   PRIMARY LEARNER Patient   HIGHEST LEVEL OF EDUCATION - PRIMARY LEARNER  2 YEARS OF COLLEGE   BARRIERS PRIMARY LEARNER NONE   CO-LEARNER CAREGIVER No   PRIMARY LANGUAGE ENGLISH   LEARNER PREFERENCE PRIMARY READING     LISTENING     DEMONSTRATION     VIDEOS   ANSWERED BY self   RELATIONSHIP SELF

## 2017-03-02 NOTE — TELEPHONE ENCOUNTER
Patient stated that his carisoprodol was approved, and that he received a 30d supply. However, upon further investigation during his visit today, 3/2/2017, apparently he was only approved for a short-term supply. Spoke with Jeanmarie re: the fact that he did, in fact, try and fail 2 of the formulary alternatives, and that information re: the trial & failure was included in an appeal that was completed on 2/14/2017. The Jeanmarie representative stated that she looked in the records, she did in fact see the receipt of the appeal, but no decision made re: that appeal.  She stated that she would bring the appeal information to the Medical Director, and she would both call and email us the results. @ 3:36pm, she left a voicemail stating that the medical director upheld the denial.  She also faxed documentation stating that     \"SOMA is indicated for the relief of discomfort associated with acute painful musculoskeletal conditions in adults. Limitations of use: should only be used for acute treatment periods for up to 2-3 weeks. \"    (submitted for scanning)

## 2017-03-02 NOTE — PATIENT INSTRUCTIONS
To Do: · Start your high-dose vitamin D to increase your levels back to normal  [facility] central scheduling should call you within the 48 hours to schedule your head CT. However, if you have not received a phone call from them in 3 business days, call them @ [phone]. Your medical testing may require authorization from your insurance company. [facility] will obtain any needed authorization for you to ensure that it is covered by your insurance. This process normally takes an additional 2 to 5 business days based on your specific insurance. Do NOT schedule your head CT until they can verify that authorization has been obtained FIRST!         Notes from your doctor:  · Both gastroenterology & neurology will call you to schedule your appointments with them  · We are looking into your carisoprodol authorization from your insurance company

## 2017-03-02 NOTE — MR AVS SNAPSHOT
Visit Information Date & Time Provider Department Dept. Phone Encounter #  
 3/2/2017 10:40 AM Crystal Lazaro  Maria L Robles [phone] [id number] Follow-up Instructions Return in about 2 months (around 5/2/2017) for 20min follow-up. Upcoming Health Maintenance Date Due FOBT Q 1 YEAR AGE 50-75 9/28/2008 DTaP/Tdap/Td series (2 - Td) 11/21/2025 Allergies as of 3/2/2017  Review Complete On: 3/2/2017 By: Gerard Gibbs LPN No Known Allergies Current Immunizations  Reviewed on 2/2/2017 Name Date Influenza Vaccine 12/14/2014 Influenza Vaccine (Quad) PF 3/2/2017 10:43 AM  
 Pneumococcal Polysaccharide (PPSV-23) 1/8/2015 Tdap 11/21/2015 Not reviewed this visit You Were Diagnosed With   
  
 Codes Comments Encounter for immunization    -  Primary ICD-10-CM: W66 ICD-9-CM: V03.89 Chronic bronchitis, unspecified chronic bronchitis type ([facility] [address]     ICD-10-CM: W62 ICD-9-CM: 491.9 Foraminal stenosis of cervical region     ICD-10-CM: M99.81 ICD-9-CM: 723.0 Abnormal skull or facial bone diagnostic imaging     ICD-10-CM: R93.0 ICD-9-CM: 793.0 Tremor     ICD-10-CM: R25.1 ICD-9-CM: 774. 0 Vitamin D deficiency     ICD-10-CM: E55.9 ICD-9-CM: 268.9 Macrocytosis without anemia     ICD-10-CM: D75.89 ICD-9-CM: 289.89 Weight loss, abnormal     ICD-10-CM: R63.4 ICD-9-CM: 783.21 Vitals BP  
  
  
  
  
  
 119/84 (BP 1 Location: Right arm, BP Patient Position: Sitting) BMI and BSA Data Body Mass Index Body Surface Area  
 18.81 kg/m 2 1.46 m 2 Preferred Pharmacy Pharmacy Name Phone [facility] [phone] Your Updated Medication List  
  
   
This list is accurate as of: 3/2/17 11:17 AM.  Always use your most recent med list.  
  
  
  
  
 carisoprodol 250 mg tablet Commonly known as:  SOMA Take 1 Tab by mouth every eight (8) hours as needed for Muscle Spasm(s). Max Daily Amount: 750 mg.  
  
 cloNIDine HCl 0.2 mg tablet Commonly known as:  CATAPRES Take 1 Tab by mouth two (2) times a day. cyanocobalamin 1,000 mcg/mL injection Commonly known as:  VITAMIN B-12  
1 mL by IntraMUSCular route once for 1 dose. ergocalciferol 50,000 unit capsule Commonly known as:  ERGOCALCIFEROL Take 1 Cap by mouth every Tuesday and Friday for 24 doses. Start taking on:  3/3/2017 SYMBICORT 160-4.5 mcg/actuation HFA inhaler Generic drug:  budesonide-formoterol Take 1 Puff by inhalation daily. VENTOLIN HFA 90 mcg/actuation inhaler Generic drug:  albuterol Take 1-2 Puffs by inhalation every four (4) hours as needed. Prescriptions Sent to Pharmacy Refills  
 ergocalciferol (ERGOCALCIFEROL) 50,000 unit capsule 0 Starting on: 3/3/2017 Sig: Take 1 Cap by mouth every Tuesday and Friday for 24 doses. Class: Normal  
 Pharmacy: [facility]  #: [phone] Route: Oral  
  
We Performed the Following INFLUENZA VIRUS VAC QUAD,SPLIT,PRESV FREE SYRINGE 3/> YRS IM [id number] CPT(R)] REFERRAL TO GASTROENTEROLOGY [WGD66 Custom] Comments:  
 Please evaluate patient for abnormal weight loss. REFERRAL TO NEUROLOGY [VKL64 Custom] Comments:  
 Please evaluate patient for severe tremor, numbness/tingling. THER/PROPH/DIAG INJECTION, SUBCUT/IM [id number] CPT(R)] VITAMIN B12 INJECTION [ [facility]] Follow-up Instructions Return in about 2 months (around 5/2/2017) for 20min follow-up. To-Do List   
 03/02/2017 Imaging:  CT HEAD FACIAL WO CONT Referral Information Referral ID Referred By Referred To  
  
 [id number] Kamran DURAND Not Available Visits Status Start Date End Date 1 New Request 3/2/17 3/2/18 If your referral has a status of pending review or denied, additional information will be sent to support the outcome of this decision. Referral ID Referred By Referred To  
 [id number] Lala King MD  
   [address] [place] Phone: [phone] Fax: [phone] Visits Status Start Date End Date 1 New Request 3/2/17 3/2/18 If your referral has a status of pending review or denied, additional information will be sent to support the outcome of this decision. Referral ID Referred By Referred To  
 [id number] [facility]  
   [address] [facility] [address] [place], [address] [place] [address] Phone: [phone] Fax: [phone] Visits Status Start Date End Date 1 New Request 3/2/17 3/2/18 If your referral has a status of pending review or denied, additional information will be sent to support the outcome of this decision. Patient Instructions To Do: 
· Start your high-dose vitamin D to increase your levels back to normal 
[facility] central scheduling should call you within the 48 hours to schedule your head CT. However, if you have not received a phone call from them in 3 business days, call them @ [phone]. Your medical testing may require authorization from your insurance company. [facility] will obtain any needed authorization for you to ensure that it is covered by your insurance. This process normally takes an additional 2 to 5 business days based on your specific insurance. Do NOT schedule your head CT until they can verify that authorization has been obtained FIRST! Notes from your doctor: · Both gastroenterology & neurology will call you to schedule your appointments with them · We are looking into your carisoprodol authorization from your insurance company Introducing [facility]!    
 Gillian Camara introduces [employer] patient portal. Now you can access parts of your medical record, email your doctor's office, and request medication refills online. 1. In your internet browser, go to [email or web address] 2. Click on the First Time User? Click Here link in the Sign In box. You will see the New Member Sign Up page. 3. Enter your NPM Access Code exactly as it appears below. You will not need to use this code after youve completed the sign-up process. If you do not sign up before the expiration date, you must request a new code. · NPM Access Code: [email or web address] Expires: 5/3/2017  7:44 AM 
 
4. Enter the last four digits of your Social Security Number (xxxx) and Date of Birth (mm/dd/yyyy) as indicated and click Submit. You will be taken to the next sign-up page. 5. Create a NPM ID. This will be your NPM login ID and cannot be changed, so think of one that is secure and easy to remember. 6. Create a NPM password. You can change your password at any time. 7. Enter your Password Reset Question and Answer. This can be used at a later time if you forget your password. 8. Enter your e-mail address. You will receive e-mail notification when new information is available in [address] 9. Click Sign Up. You can now view and download portions of your medical record. 10. Click the Download Summary menu link to download a portable copy of your medical information. If you have questions, please visit the Frequently Asked Questions section of the NPM website. Remember, NPM is NOT to be used for urgent needs. For medical emergencies, dial 911. Now available from your iPhone and Android! Please provide this summary of care documentation to your next provider. If you have any questions after today's visit, please call [phone].

## 2017-03-02 NOTE — TELEPHONE ENCOUNTER
Called patient and left msg stating that [employer]affi Arce will not authorize any further Rx for carisoprodol 250mg tablet, and that \"you have now exhausted your appeal rights through [employer]\" and \"if you want to keep going with your appeal, you or your authorized representative can ask the DMAS ([address] [place] [address]) for a state fair hearing. \"    I did left a message stating that quite frankly, I don't think this is a valid reason to fill out a state level appeal request, and that we should try other muscle relaxant medications, or he can ask the neurologist when he sees them if they think there are any other options. Bruce Gates called back and he was informed of the above.

## 2017-03-21 ENCOUNTER — TELEPHONE (OUTPATIENT)
Dept: FAMILY MEDICINE CLINIC | Age: 57
End: 2017-03-21

## 2017-03-21 NOTE — TELEPHONE ENCOUNTER
[facility] called    Authorization for CT of Brain  Authorization # [id number]   Valid March 21st to May 5th 2017    Will be sending authorization paperwork

## 2017-03-21 NOTE — TELEPHONE ENCOUNTER
Please call Keo Arango. I am not comfortable increasing his Soma to that dose, particularly since it is not recommended for prolonged use due to decreasing effectiveness. It is very important for him to discuss his neurologist during his upcoming appointment. Has he had that scheduled yet? If so, for when?

## 2017-03-21 NOTE — TELEPHONE ENCOUNTER
Patient called and states that the soma 250 mg isn't working as well and would like you to change it to 350 mg bid. Patient states his insurance is still not covering so would like some coupons to use from [employer] or anything you have.

## 2017-03-22 NOTE — TELEPHONE ENCOUNTER
Noted. Received authorization paperwork. Will submit for scanning after verifying that CT has been scheduled.

## 2017-03-22 NOTE — TELEPHONE ENCOUNTER
Patient informed he is scheduled to see Neurologist on May 4 @ 1:45 pm     [facility]   [address] [facility], [address]  Phone # [phone]  Fax # [phone]

## 2017-03-22 NOTE — TELEPHONE ENCOUNTER
Spoke to patient he stated it was canceled gave him auth # [id number] good from 3/21/17-5/5/17. He will called them to schedule.

## 2017-03-22 NOTE — TELEPHONE ENCOUNTER
Informed patient that insurance will not cover his soma and he will have to get on line and print a coupon from [employer] to take to pharmacy.

## 2017-03-23 ENCOUNTER — TELEPHONE (OUTPATIENT)
Dept: FAMILY MEDICINE CLINIC | Age: 57
End: 2017-03-23

## 2017-03-23 NOTE — TELEPHONE ENCOUNTER
Pt called stating that when he goes on [facility] his medication is still over $100. He is asking his medication be changed to 350 mgs because it is only $28 as opposed to the lower dose being much more expensive. Pt informed Dr. Boucher Him was not comfortable increasing dose. Please advise.

## 2017-03-23 NOTE — TELEPHONE ENCOUNTER
Please call Anaid Ac, and inform him \"Dr. Boucher Him is not willing to increase the dose of a controlled substance for only cost purposes. \"

## 2017-05-09 ENCOUNTER — OFFICE VISIT (OUTPATIENT)
Dept: FAMILY MEDICINE CLINIC | Age: 57
End: 2017-05-09

## 2017-05-09 VITALS
OXYGEN SATURATION: 98 % | HEIGHT: 63 IN | RESPIRATION RATE: 18 BRPM | HEART RATE: 73 BPM | BODY MASS INDEX: 17.58 KG/M2 | WEIGHT: 99.2 LBS | SYSTOLIC BLOOD PRESSURE: 114 MMHG | DIASTOLIC BLOOD PRESSURE: 74 MMHG | TEMPERATURE: 96.6 F

## 2017-05-09 DIAGNOSIS — M48.02 FORAMINAL STENOSIS OF CERVICAL REGION: ICD-10-CM

## 2017-05-09 DIAGNOSIS — J42 CHRONIC BRONCHITIS, UNSPECIFIED CHRONIC BRONCHITIS TYPE (HCC): Primary | Chronic | ICD-10-CM

## 2017-05-09 DIAGNOSIS — R63.4 WEIGHT LOSS, ABNORMAL: ICD-10-CM

## 2017-05-09 DIAGNOSIS — M54.5 CHRONIC LEFT-SIDED LOW BACK PAIN, WITH SCIATICA PRESENCE UNSPECIFIED: Chronic | ICD-10-CM

## 2017-05-09 DIAGNOSIS — Z91.199 NONCOMPLIANCE: ICD-10-CM

## 2017-05-09 DIAGNOSIS — R25.1 TREMOR: ICD-10-CM

## 2017-05-09 DIAGNOSIS — G89.29 CHRONIC LEFT-SIDED LOW BACK PAIN, WITH SCIATICA PRESENCE UNSPECIFIED: Chronic | ICD-10-CM

## 2017-05-09 RX ORDER — ALBUTEROL SULFATE 90 UG/1
1-2 AEROSOL, METERED RESPIRATORY (INHALATION)
Qty: 1 INHALER | Refills: 5 | Status: SHIPPED | OUTPATIENT
Start: 2017-05-09

## 2017-05-09 RX ORDER — AZITHROMYCIN 250 MG/1
TABLET, FILM COATED ORAL
Qty: 6 TAB | Refills: 0 | Status: SHIPPED | OUTPATIENT
Start: 2017-05-09 | End: 2017-05-14

## 2017-05-09 RX ORDER — IPRATROPIUM BROMIDE AND ALBUTEROL SULFATE 2.5; .5 MG/3ML; MG/3ML
3 SOLUTION RESPIRATORY (INHALATION)
Qty: 1 NEBULE | Refills: 0 | Status: SHIPPED | OUTPATIENT
Start: 2017-05-09 | End: 2017-05-09

## 2017-05-09 RX ORDER — BUDESONIDE AND FORMOTEROL FUMARATE DIHYDRATE 80; 4.5 UG/1; UG/1
2 AEROSOL RESPIRATORY (INHALATION) 2 TIMES DAILY
Qty: 1 INHALER | Refills: 5 | Status: SHIPPED | OUTPATIENT
Start: 2017-05-09 | End: 2017-09-29 | Stop reason: SDUPTHER

## 2017-05-09 NOTE — MR AVS SNAPSHOT
Visit Information Date & Time Provider Department Dept. Phone Encounter #  
 5/9/2017 10:20 AM Adam Ballesteros MD [facility] [phone] [id number] Follow-up Instructions Return in about 3 months (around 8/9/2017) for 20min follow-up. Upcoming Health Maintenance Date Due FOBT Q 1 YEAR AGE 50-75 2/12/2017 INFLUENZA AGE 9 TO ADULT 8/1/2017 DTaP/Tdap/Td series (2 - Td) 11/21/2025 Allergies as of 5/9/2017  Review Complete On: 5/9/2017 By: Adam Ballesteros MD  
 No Known Allergies Current Immunizations  Reviewed on 2/2/2017 Name Date Influenza Vaccine 12/14/2014 Influenza Vaccine (Quad) PF 3/2/2017 10:43 AM  
 Pneumococcal Polysaccharide (PPSV-23) 1/8/2015 Tdap 11/21/2015 Not reviewed this visit You Were Diagnosed With   
  
 Codes Comments Chronic bronchitis, unspecified chronic bronchitis type ([facility]    -  Primary ICD-10-CM: E46 ICD-9-CM: 491.9 Weight loss, abnormal     ICD-10-CM: R63.4 ICD-9-CM: 783.21 Tremor     ICD-10-CM: R25.1 ICD-9-CM: 678. 0 Vitals BP Pulse Temp Resp Height(growth percentile) Weight(growth percentile) 114/74 (BP 1 Location: Left arm, BP Patient Position: Sitting) 73 96.6 °F (35.9 °C) (Oral) 18 5' 3\" (1.6 m) 99 lb 3.2 oz (45 kg) SpO2 PF BMI Smoking Status 98% 100 L/min 17.57 kg/m2 Former Smoker Vitals History BMI and BSA Data Body Mass Index Body Surface Area  
 17.57 kg/m 2 1.41 m 2 Preferred Pharmacy Pharmacy Name Phone [phone] Your Updated Medication List  
  
   
This list is accurate as of: 5/9/17 11:55 AM.  Always use your most recent med list.  
  
  
  
  
 albuterol 90 mcg/actuation inhaler Commonly known as:  PROVENTIL HFA, VENTOLIN HFA, PROAIR HFA Take 1-2 Puffs by inhalation every four (4) hours as needed for Wheezing or Shortness of Breath. albuterol-ipratropium 2.5 mg-0.5 mg/3 ml Nebu Commonly known as:  DUO-NEB  
3 mL by Nebulization route now for 1 dose. azithromycin 250 mg tablet Commonly known as:  Jannell Carls Take 2 tablets today, then take 1 tablet daily  
  
 budesonide-formoterol 80-4.5 mcg/actuation Hfaa inhaler Commonly known as:  SYMBICORT Take 2 Puffs by inhalation two (2) times a day. carisoprodol 250 mg tablet Commonly known as:  SOMA Take 1 Tab by mouth every eight (8) hours as needed for Muscle Spasm(s). Max Daily Amount: 750 mg.  
  
 cloNIDine HCl 0.2 mg tablet Commonly known as:  CATAPRES Take 1 Tab by mouth daily. ergocalciferol 50,000 unit capsule Commonly known as:  ERGOCALCIFEROL Take 1 Cap by mouth every Tuesday and Friday for 24 doses. Prescriptions Printed Refills  
 azithromycin (ZITHROMAX) 250 mg tablet 0 Sig: Take 2 tablets today, then take 1 tablet daily Class: Print  
 budesonide-formoterol (SYMBICORT) 80-4.5 mcg/actuation HFAA inhaler 5 Sig: Take 2 Puffs by inhalation two (2) times a day. Class: Print Route: Inhalation  
 albuterol (PROVENTIL HFA, VENTOLIN HFA, PROAIR HFA) 90 mcg/actuation inhaler 5 Sig: Take 1-2 Puffs by inhalation every four (4) hours as needed for Wheezing or Shortness of Breath. Class: Print Route: Inhalation Prescriptions Sent to Pharmacy Refills  
 albuterol-ipratropium (DUO-NEB) 2.5 mg-0.5 mg/3 ml nebu 0 Sig: 3 mL by Nebulization route now for 1 dose. Class: Normal  
 [facility] [address] Ph #: [phone] Route: Nebulization We Performed the Following ALBUTEROL IPRATROP NON-COMP [id number] [facility]] [place] INHAL RX, AIRWAY OBST/DX SPUTUM INDUCT [id number] CPT(R)] Follow-up Instructions Return in about 3 months (around 8/9/2017) for 20min follow-up. To-Do List   
 05/09/2017 Imaging:  XR CHEST PA LAT Patient Instructions To Do: · take the antibiotics as directed until all the pills are gone! It is important to take antibiotics as directed in order to prevent antibiotic resistance. · Restart your controller medication (Symbicort), and your albuterol inhaler (used as needed) to control your lung disease. Notes from your doctor: · In order to improve upon your health, you need to make and keep your specialist appointments. Because you have \"no showed\" for both specialists, we will require that you call to make your own appointments with them. · I cannot increase your soma, which is a controlled substance, for cost purposes only. In addition, Brice Frankie is only FDA approved for [facility] term use\" [facility] Patient no showed on May 4th at 1:45pm. 
[address], [address] [place] [address] Phone # [phone] Fax # [phone] [facility] Patient no showed for appointment. [address] Zamzam Pryor [facility] [address] [place] [address] Phone # [phone] Fax # [phone] Learning About Chronic Bronchitis What is chronic bronchitis? Chronic bronchitis is long-term swelling and the buildup of mucus in the airways of your lungs. The airways (bronchial tubes) get inflamed and make a lot of mucus. This can narrow or block the airways, making it hard for you to breathe. It is a form of COPD (chronic obstructive pulmonary disease). Chronic bronchitis is usually caused by smoking. But chemical fumes, dust, or air pollution also can cause it over time. What can you expect when you have chronic bronchitis? Chronic bronchitis gets worse over time. You cannot undo the damage to your lungs. Over time, you may find that: 
· You get short of breath even when you do simple things like get dressed or fix a meal. 
· It is hard to eat or exercise. · You lose weight and feel weaker. Over many years, the swelling and mucus from chronic bronchitis make it more likely that you will get lung infections. But there are things you can do to prevent more damage and feel better. What are the symptoms? The main symptoms of chronic bronchitis are: · A cough that will not go away. · Mucus that comes up when you cough. · Shortness of breath that gets worse when you exercise. At times, your symptoms may suddenly flare up and get much worse. This is a called an exacerbation (say \"egg-ZHAO-colt-BAY-paulette\"). When this happens, your usual symptoms quickly get worse and stay bad. This can be dangerous. You may have to go to the hospital. 
How can you keep chronic bronchitis from getting worse? Don't smoke. That is the best way to keep chronic bronchitis from getting worse. If you already smoke, it is never too late to stop. If you need help quitting, talk to your doctor about stop-smoking programs and medicines. These can increase your chances of quitting for good. You can do other things to keep chronic bronchitis from getting worse: · Avoid bad air. Air pollution, chemical fumes, and dust also can make chronic bronchitis worse. · Get a flu shot every year. A shot may keep the flu from turning into something more serious, like pneumonia. A flu shot also may lower your chances of having a flare-up. · Get a pneumococcal shot. A shot can prevent some of the serious complications of pneumonia. Ask your doctor how often you should get this shot. How is chronic bronchitis treated? Chronic bronchitis is treated with medicines and oxygen. You also can take steps at home to stay healthy and keep your condition from getting worse. Medicines and oxygen therapy · You may be taking medicines such as: ¨ Bronchodilators. These help open your airways and make breathing easier.  Bronchodilators are either short-acting (work for 6 to 9 hours) or long-acting (work for 24 hours). You inhale most bronchodilators, so they start to act quickly. Always carry your quick-relief inhaler with you in case you need it while you are away from home. ¨ Corticosteroids. These reduce airway inflammation. They come in pill or inhaled form. You must take these medicines every day for them to work well. ¨ Antibiotics. These medicines are used when you have a bacterial lung infection. · Take your medicines exactly as prescribed. Call your doctor if you think you are having a problem with your medicine. · Oxygen therapy boosts the amount of oxygen in your blood and helps you breathe easier. Use the flow rate your doctor has recommended, and do not change it without talking to your doctor first. 
Other care at home · If your doctor recommends it, get more exercise. Walking is a good choice. Bit by bit, increase the amount you walk every day. Try for at least 30 minutes on most days of the week. · Learn breathing methodssuch as breathing through pursed lipsto help you become less short of breath. · If your doctor has not set you up with a pulmonary rehabilitation program, talk to him or her about whether rehab is right for you. Rehab includes exercise programs, education about your disease and how to manage it, help with diet and other changes, and emotional support. · Eat regular, healthy meals. Use bronchodilators about 1 hour before you eat to make it easier to eat. Eat several small meals instead of three large ones. Drink beverages at the end of the meal. Avoid foods that are hard to chew. Follow-up care is a key part of your treatment and safety. Be sure to make and go to all appointments, and call your doctor if you are having problems. It's also a good idea to know your test results and keep a list of the medicines you take. Where can you learn more? Go to http://eileen-crystal.info/. Enter I390 in the search box to learn more about \"Learning About Chronic Bronchitis. \" Current as of: May 23, 2016 Content Version: 11.2 © 1396-1494 [employer]. Care instructions adapted under license by [employer] (which disclaims liability or warranty for this information). If you have questions about a medical condition or this instruction, always ask your healthcare professional. [address] any warranty or liability for your use of this information. COPD Exacerbation Plan: Care Instructions Your Care Instructions If you have chronic obstructive pulmonary disease (COPD), your usual shortness of breath could suddenly get worse. You may start coughing more and have more mucus. This flare-up is called a COPD exacerbation (say \"pe-ADE-tu-BAY-shun\"). A lung infection or air pollution could set off an exacerbation. Sometimes it can happen after a quick change in temperature or being around chemicals. Work with your doctor to make a plan for dealing with an exacerbation. You can better manage it if you plan ahead. Follow-up care is a key part of your treatment and safety. Be sure to make and go to all appointments, and call your doctor if you are having problems. It's also a good idea to know your test results and keep a list of the medicines you take. How can you care for yourself at home? During an exacerbation · Do not panic if you start to have one. Quick treatment at home may help you prevent serious breathing problems. If you have a COPD exacerbation plan that you developed with your doctor, follow it. · Take your medicines exactly as your doctor tells you. ¨ Use your inhaler as directed by your doctor. If your symptoms do not get better after you use your medicine, have someone take you to the emergency room. Call an ambulance if necessary.  
¨ With inhaled medicines, a spacer or a nebulizer may help you get more medicine to your lungs. Ask your doctor or pharmacist how to use them properly. Practice using the spacer in front of a mirror before you have an exacerbation. This may help you get the medicine into your lungs quickly. ¨ If your doctor has given you steroid pills, take them as directed. ¨ Your doctor may have given you a prescription for antibiotics, which you can fill if you need it. ¨ Talk to your doctor if you have any problems with your medicine. And call your doctor if you have to use your antibiotic or steroid pills. Preventing an exacerbation · Do not smoke. This is the most important step you can take to prevent more damage to your lungs and prevent problems. If you already smoke, it is never too late to stop. If you need help quitting, talk to your doctor about stop-smoking programs and medicines. These can increase your chances of quitting for good. · Take your daily medicines as prescribed. · Avoid colds and flu. ¨ Get a pneumococcal vaccine. ¨ Get a flu vaccine each year, as soon as it is available. Ask those you live or work with to do the same, so they will not get the flu and infect you. ¨ Try to stay away from people with colds or the flu. ¨ Wash your hands often. · Avoid secondhand smoke; air pollution; cold, dry air; hot, humid air; and high altitudes. Stay at home with your windows closed when air pollution is bad. · Learn breathing techniques for COPD, such as breathing through pursed lips. These techniques can help you breathe easier during an exacerbation. When should you call for help? Call 911 anytime you think you may need emergency care. For example, call if: 
· You have severe trouble breathing. · You have severe chest pain. Call your doctor now or seek immediate medical care if: 
· You have new or worse shortness of breath. · You develop new chest pain.  
· You are coughing more deeply or more often, especially if you notice more mucus or a change in the color of your mucus. · You cough up blood. · You have new or increased swelling in your legs or belly. · You have a fever. Watch closely for changes in your health, and be sure to contact your doctor if: 
· You need to use your antibiotic or steroid pills. · Your symptoms are getting worse. Where can you learn more? Go to http://eileen-crystal.info/. Enter U742 in the search box to learn more about \"COPD Exacerbation Plan: Care Instructions. \" Current as of: July 21, 2016 Content Version: 11.2 © 6913-2808 [employer]. Care instructions adapted under license by [employer] (which disclaims liability or warranty for this information). If you have questions about a medical condition or this instruction, always ask your healthcare professional. [address] any warranty or liability for your use of this information. Introducing [facility]! Katie Espinoza introduces [employer] patient portal. Now you can access parts of your medical record, email your doctor's office, and request medication refills online. 1. In your internet browser, go to [email or web address] 2. Click on the First Time User? Click Here link in the Sign In box. You will see the New Member Sign Up page. 3. Enter your [employer] Access Code exactly as it appears below. You will not need to use this code after youve completed the sign-up process. If you do not sign up before the expiration date, you must request a new code. · [employer] Access Code: [email or web address] Expires: 8/7/2017 11:55 AM 
 
4. Enter the last four digits of your Social Security Number (xxxx) and Date of Birth (mm/dd/yyyy) as indicated and click Submit. You will be taken to the next sign-up page. 5. Create a [employer] ID. This will be your [employer] login ID and cannot be changed, so think of one that is secure and easy to remember. 6. Create a [employer] password. You can change your password at any time. 7. Enter your Password Reset Question and Answer. This can be used at a later time if you forget your password. 8. Enter your e-mail address. You will receive e-mail notification when new information is available in [address] 9. Click Sign Up. You can now view and download portions of your medical record. 10. Click the Download Summary menu link to download a portable copy of your medical information. If you have questions, please visit the Frequently Asked Questions section of the [employer] website. Remember, [employer] is NOT to be used for urgent needs. For medical emergencies, dial 911. Now available from your iPhone and Android! Please provide this summary of care documentation to your next provider. If you have any questions after today's visit, please call [phone].

## 2017-05-09 NOTE — PROGRESS NOTES
Ava Hanna is a 60 y.o. male here for a 2 month follow up. Patient states he has not seen neurology yet. 1. Have you been to the ER, urgent care clinic or hospitalized since your last visit? NO.     2. Have you seen or consulted any other health care providers outside of the [address] [place] [address] since your last visit (Include any pap smears or colon screening)? NO      Do you have an Advanced Directive? NO    Would you like information on Advanced Directives?  NO    Learning Assessment 2/2/2017   PRIMARY LEARNER Patient   HIGHEST LEVEL OF EDUCATION - PRIMARY LEARNER  2 YEARS OF COLLEGE   BARRIERS PRIMARY LEARNER NONE   CO-LEARNER CAREGIVER No   PRIMARY LANGUAGE ENGLISH   LEARNER PREFERENCE PRIMARY READING     LISTENING     DEMONSTRATION     VIDEOS   ANSWERED BY self   RELATIONSHIP SELF

## 2017-05-09 NOTE — PATIENT INSTRUCTIONS
To Do:  · take the antibiotics as directed until all the pills are gone! It is important to take antibiotics as directed in order to prevent antibiotic resistance. · Restart your controller medication (Symbicort), and your albuterol inhaler (used as needed) to control your lung disease. Notes from your doctor:  · In order to improve upon your health, you need to make and keep your specialist appointments. Because you have \"no showed\" for both specialists, we will require that you call to make your own appointments with them. · I cannot increase your soma, which is a controlled substance, for cost purposes only. In addition, Marques Echols is only FDA approved for SHEMAR. LORIN Shrestha term use\"          [facility]   Patient no showed on May 4th at 1:45pm.  [address] [place] [address], [address] [place] [address] Road  Phone # [phone]  Fax # [phone]      [facility] Patient no showed for appointment. [address]  Phone # [phone]  Fax # [phone]         Learning About Chronic Bronchitis  What is chronic bronchitis? Chronic bronchitis is long-term swelling and the buildup of mucus in the airways of your lungs. The airways (bronchial tubes) get inflamed and make a lot of mucus. This can narrow or block the airways, making it hard for you to breathe. It is a form of COPD (chronic obstructive pulmonary disease). Chronic bronchitis is usually caused by smoking. But chemical fumes, dust, or air pollution also can cause it over time. What can you expect when you have chronic bronchitis? Chronic bronchitis gets worse over time. You cannot undo the damage to your lungs. Over time, you may find that:  · You get short of breath even when you do simple things like get dressed or fix a meal.  · It is hard to eat or exercise. · You lose weight and feel weaker.   Over many years, the swelling and mucus from chronic bronchitis make it more likely that you will get lung infections. But there are things you can do to prevent more damage and feel better. What are the symptoms? The main symptoms of chronic bronchitis are:  · A cough that will not go away. · Mucus that comes up when you cough. · Shortness of breath that gets worse when you exercise. At times, your symptoms may suddenly flare up and get much worse. This is a called an exacerbation (say \"egg-ZASS-er-BAY-shun\"). When this happens, your usual symptoms quickly get worse and stay bad. This can be dangerous. You may have to go to the hospital.  How can you keep chronic bronchitis from getting worse? Don't smoke. That is the best way to keep chronic bronchitis from getting worse. If you already smoke, it is never too late to stop. If you need help quitting, talk to your doctor about stop-smoking programs and medicines. These can increase your chances of quitting for good. You can do other things to keep chronic bronchitis from getting worse:  · Avoid bad air. Air pollution, chemical fumes, and dust also can make chronic bronchitis worse. · Get a flu shot every year. A shot may keep the flu from turning into something more serious, like pneumonia. A flu shot also may lower your chances of having a flare-up. · Get a pneumococcal shot. A shot can prevent some of the serious complications of pneumonia. Ask your doctor how often you should get this shot. How is chronic bronchitis treated? Chronic bronchitis is treated with medicines and oxygen. You also can take steps at home to stay healthy and keep your condition from getting worse. Medicines and oxygen therapy  · You may be taking medicines such as:  ¨ Bronchodilators. These help open your airways and make breathing easier. Bronchodilators are either short-acting (work for 6 to 9 hours) or long-acting (work for 24 hours). You inhale most bronchodilators, so they start to act quickly.  Always carry your quick-relief inhaler with you in case you need it while you are away from home. ¨ Corticosteroids. These reduce airway inflammation. They come in pill or inhaled form. You must take these medicines every day for them to work well. ¨ Antibiotics. These medicines are used when you have a bacterial lung infection. · Take your medicines exactly as prescribed. Call your doctor if you think you are having a problem with your medicine. · Oxygen therapy boosts the amount of oxygen in your blood and helps you breathe easier. Use the flow rate your doctor has recommended, and do not change it without talking to your doctor first.  Other care at home  · If your doctor recommends it, get more exercise. Walking is a good choice. Bit by bit, increase the amount you walk every day. Try for at least 30 minutes on most days of the week. · Learn breathing methodssuch as breathing through pursed lipsto help you become less short of breath. · If your doctor has not set you up with a pulmonary rehabilitation program, talk to him or her about whether rehab is right for you. Rehab includes exercise programs, education about your disease and how to manage it, help with diet and other changes, and emotional support. · Eat regular, healthy meals. Use bronchodilators about 1 hour before you eat to make it easier to eat. Eat several small meals instead of three large ones. Drink beverages at the end of the meal. Avoid foods that are hard to chew. Follow-up care is a key part of your treatment and safety. Be sure to make and go to all appointments, and call your doctor if you are having problems. It's also a good idea to know your test results and keep a list of the medicines you take. Where can you learn more? Go to http://eileen-crystal.info/. Enter A932 in the search box to learn more about \"Learning About Chronic Bronchitis. \"  Current as of: May 23, 2016  Content Version: 11.2  © 3806-8733 [employer], [employer].  Care instructions adapted under license by [facility] (which disclaims liability or warranty for this information). If you have questions about a medical condition or this instruction, always ask your healthcare professional. [address] any warranty or liability for your use of this information. COPD Exacerbation Plan: Care Instructions  Your Care Instructions  If you have chronic obstructive pulmonary disease (COPD), your usual shortness of breath could suddenly get worse. You may start coughing more and have more mucus. This flare-up is called a COPD exacerbation (say \"kw-AFU-ja-BAY-paulette\"). A lung infection or air pollution could set off an exacerbation. Sometimes it can happen after a quick change in temperature or being around chemicals. Work with your doctor to make a plan for dealing with an exacerbation. You can better manage it if you plan ahead. Follow-up care is a key part of your treatment and safety. Be sure to make and go to all appointments, and call your doctor if you are having problems. It's also a good idea to know your test results and keep a list of the medicines you take. How can you care for yourself at home? During an exacerbation  · Do not panic if you start to have one. Quick treatment at home may help you prevent serious breathing problems. If you have a COPD exacerbation plan that you developed with your doctor, follow it. · Take your medicines exactly as your doctor tells you. ¨ Use your inhaler as directed by your doctor. If your symptoms do not get better after you use your medicine, have someone take you to the emergency room. Call an ambulance if necessary. ¨ With inhaled medicines, a spacer or a nebulizer may help you get more medicine to your lungs. Ask your doctor or pharmacist how to use them properly. Practice using the spacer in front of a mirror before you have an exacerbation. This may help you get the medicine into your lungs quickly.   ¨ If your doctor has given you steroid pills, take them as directed. ¨ Your doctor may have given you a prescription for antibiotics, which you can fill if you need it. ¨ Talk to your doctor if you have any problems with your medicine. And call your doctor if you have to use your antibiotic or steroid pills. Preventing an exacerbation  · Do not smoke. This is the most important step you can take to prevent more damage to your lungs and prevent problems. If you already smoke, it is never too late to stop. If you need help quitting, talk to your doctor about stop-smoking programs and medicines. These can increase your chances of quitting for good. · Take your daily medicines as prescribed. · Avoid colds and flu. ¨ Get a pneumococcal vaccine. ¨ Get a flu vaccine each year, as soon as it is available. Ask those you live or work with to do the same, so they will not get the flu and infect you. ¨ Try to stay away from people with colds or the flu. ¨ Wash your hands often. · Avoid secondhand smoke; air pollution; cold, dry air; hot, humid air; and high altitudes. Stay at home with your windows closed when air pollution is bad. · Learn breathing techniques for COPD, such as breathing through pursed lips. These techniques can help you breathe easier during an exacerbation. When should you call for help? Call 911 anytime you think you may need emergency care. For example, call if:  · You have severe trouble breathing. · You have severe chest pain. Call your doctor now or seek immediate medical care if:  · You have new or worse shortness of breath. · You develop new chest pain. · You are coughing more deeply or more often, especially if you notice more mucus or a change in the color of your mucus. · You cough up blood. · You have new or increased swelling in your legs or belly. · You have a fever. Watch closely for changes in your health, and be sure to contact your doctor if:  · You need to use your antibiotic or steroid pills.   · Your symptoms are getting worse. Where can you learn more? Go to http://eileen-crystal.info/. Enter J526 in the search box to learn more about \"COPD Exacerbation Plan: Care Instructions. \"  Current as of: July 21, 2016  Content Version: 11.2  © 5322-9532 [employer], [employer]. Care instructions adapted under license by [employer] (which disclaims liability or warranty for this information). If you have questions about a medical condition or this instruction, always ask your healthcare professional. [address] any warranty or liability for your use of this information.

## 2017-05-09 NOTE — PROGRESS NOTES
[address] [facility]  Primary Care Office Visit - Problem-Oriented    : 1960   Cindy Marquez is a 60 y.o. male presenting for  Chief Complaint   Patient presents with    Hypertension    Weight Loss    Leg Pain     left calf       Assessment/Plan:     1. Chronic bronchitis, unspecified chronic bronchitis type (HCC)  Uncontrolled, has not been taking controller meds. Improved s/p duoneb tx in the office. Restart symbicort. Zpak until completed. Albuterol prn.  - XR CHEST PA LAT; Future  - ALBUTEROL IPRATROP NON-COMP  - IL INHAL RX, AIRWAY OBST/DX SPUTUM INDUCT  - albuterol-ipratropium (DUO-NEB) 2.5 mg-0.5 mg/3 ml nebu; 3 mL by Nebulization route now for 1 dose. Dispense: 1 Nebule; Refill: 0  - azithromycin (ZITHROMAX) 250 mg tablet; Take 2 tablets today, then take 1 tablet daily  Dispense: 6 Tab; Refill: 0  - budesonide-formoterol (SYMBICORT) 80-4.5 mcg/actuation HFAA inhaler; Take 2 Puffs by inhalation two (2) times a day. Dispense: 1 Inhaler; Refill: 5  - albuterol (PROVENTIL HFA, VENTOLIN HFA, PROAIR HFA) 90 mcg/actuation inhaler; Take 1-2 Puffs by inhalation every four (4) hours as needed for Wheezing or Shortness of Breath. Dispense: 1 Inhaler; Refill: 5    2. Weight loss, abnormal  Ongoing complaint, down 7# in last 2 months. Did not follow-up with GI. Encouraged to follow-up with GI.    3. Tremor  4. Chronic left-sided low back pain, with sciatica presence unspecified  5. Foraminal stenosis of cervical region  Ongoing complaint. Will not continue Soma Rx since only approved for short-term use. Most certainly will not increase Soma dosing \"just because it is cheaper\". Did not follow-up with neuro. Encouraged to follow-up with neuro. 6. Noncompliance  Discussed at length with Cindy Marquez that I cannot help him if he does not follow through with the recommendations that I give him with re: to obtaining specialty consultation. Cindy Marquez voiced understanding.     This document may have been created with the aid of dictation software. Text may contain errors, particularly phonetic errors. Reviewed management plan & instructions with patient, who voiced understanding. Carter Gallegos MD  Internal Medicine, Family Medicine & Sports Medicine  5/9/2017, 10:49 AM    Patient Instructions (provided in AVS): To Do:  · take the antibiotics as directed until all the pills are gone! It is important to take antibiotics as directed in order to prevent antibiotic resistance. · Restart your controller medication (Symbicort), and your albuterol inhaler (used as needed) to control your lung disease. Notes from your doctor:  · In order to improve upon your health, you need to make and keep your specialist appointments. Because you have \"no showed\" for both specialists, we will require that you call to make your own appointments with them. · I cannot increase your soma, which is a controlled substance, for cost purposes only. In addition, Marques Echols is only FDA approved for [facility] [facility] [facility] term use\"    [facility]   Patient no showed on May 4th at 1:45pm.  [address] [place] [address], [address] [place] [address]  Phone # [phone]  Fax # [phone]    [facility] Patient no showed for appointment. [address]   Phone # [phone]  Fax # [phone]       Learning About Chronic Bronchitis  COPD Exacerbation Plan: Care Instructions      History:   Leon Lazaro is a 60 y.o. male presenting to address:  Chief Complaint   Patient presents with    Hypertension    Weight Loss    Leg Pain     left calf       \"I'm having some issues. My legs. .. This morning, both my legs locked up, lower legs down to feet. And my right one seemed like it came back from the cramping. \"  \"feels like I'm setting on needles. \"    Never made it to neurology because he couldn't find a ride. Didn't call to cancel either.   Also no showed to gastroenterology. But still voices concerns re: pain and weight loss. \"can't you just prescribe me the Soma 350mg, because they are so much cheaper than the 250mg\". Also complains of uncontrolled coughing & wheezing. Hasn't had any of his inhaled medications for 2 months. Cough is occasionally productive. Denies any fevers, chills. Past Medical History:   Diagnosis Date    Allergic rhinitis     Allergic rhinitis     Arthropathy, unspecified, site unspecified     Chronic low back pain     COPD (chronic obstructive pulmonary disease) (HCC)     COPD with acute exacerbation (HCC)     Depression     Digestive disorder     Dyspnea     GERD (gastroesophageal reflux disease)     Hypertension     Malnutrition (HCC)     Nocturia     Snoring     Tremor     Unspecified cerebral artery occlusion with cerebral infarction     Weight decrease     Weight loss, abnormal      History reviewed. No pertinent surgical history. reports that he quit smoking about 21 months ago. He has a 22.50 pack-year smoking history. He has never used smokeless tobacco. He reports that he does not drink alcohol or use illicit drugs. Social History     Social History Narrative     History   Smoking Status    Former Smoker    Packs/day: 0.50    Years: 45.00    Quit date: 7/13/2015   Smokeless Tobacco    Never Used     Family History   Problem Relation Age of Onset    Cancer Mother     Other Father      Physical Abuse    Hypertension Brother      No Known Allergies    Problem List:      Patient Active Problem List    Diagnosis    Foraminal stenosis of cervical region     MRI C-spine (2/24/2017): Moderate C6/7 degenerative disease, with relatively mild DDD at other cervical levels. High grade degenerative neural foramen stenosis C5/6 and C6/7. No significant spinal canal stenosis.  Abnormal skull or facial bone diagnostic imaging     MRI c-spine (2/24/2017):   Marrow lesion within clivus is not fully characterized, though it does not demonstrate aggressive features and appears to have been present at the comparison CT. DDx includes arrested pneumatization of the sphenoid sinus, though recommend follow-up skull base CT to confirm stability & exclude more significant pathology.  Macrocytosis without anemia    Vitamin D deficiency    Weight loss, abnormal    Tremor    Hypertension    Chronic bronchitis (HCC)    Chronic low back pain     MRI L-spine (2/23/2017): Mild DDD. Tiny L5/S1 disc protrusion. No canal or foraminal stenosis at any level. Medications:     Current Outpatient Prescriptions   Medication Sig    ergocalciferol (ERGOCALCIFEROL) 50,000 unit capsule Take 1 Cap by mouth every Tuesday and Friday for 24 doses.  carisoprodol (SOMA) 250 mg tablet Take 1 Tab by mouth every eight (8) hours as needed for Muscle Spasm(s). Max Daily Amount: 750 mg.    SYMBICORT 160-4.5 mcg/actuation HFA inhaler Take 1 Puff by inhalation daily.  cloNIDine HCl (CATAPRES) 0.2 mg tablet Take 1 Tab by mouth daily.  VENTOLIN HFA 90 mcg/actuation inhaler Take 1-2 Puffs by inhalation every four (4) hours as needed. No current facility-administered medications for this visit.         Review of Systems:     (positives in bold)   Constitutional: fatigue, weight change, appetite change, fevers, chills   Eyes: itchy eyes, runny eyes, red eyes, eye discharge, vision changes, light sensitivity   Neuro: headaches, vision changes, dizziness, loss of consciousness, burning pain, tingling, numbness   ENT: ear pain, ear pressure, ear popping, ear discharge/drainage, hearing change,nosebleeds, sneezing, runny nose, nasal congestion,  change in sense of smell, sore throat, voice change or hoarse voice,   dry mouth, toothache, jaw popping, difficulty swallowing, painful swallowing,   oral ulcers or canker sores   Cardiovascular: chest pain, palpitations, orthopnea, PND   Respiratory: dyspnea, wheezing, cough, sputum production, hemoptysis   GI: nausea, vomiting, heartburn, abdominal pain, greasy stools,   blood in stool, diarrhea, constipation   : dysuria, hematuria, change in urine, urinary frequency, urinary urgency   MSK: muscle/joint pain, joint swelling   Derm: rashes, skin changes   Allergy/Imm: seasonal allergies, itchy eyes   Endocrine: Polyuria, polydipsia, polyphagia, heat intolerance, cold intolerance   Heme/lymph: easy bleeding/bruising   Psych: Suicidal ideation, homicidal ideation           Physical Assessment:   VS:     05/09/17 1032   BP: 114/74   Pulse: 73   Resp: 18   Temp: 96.6 °F (35.9 °C)   TempSrc: Oral   SpO2: 98%   Weight: 99 lb 3.2 oz (45 kg)   Height: 5' 3\" (1.6 m)   PF: 200 L/min   PainSc:   5   PainLoc: Leg       General:    Thin male, in NAD. Head:     PERRL, EOMI. MMM, poor dentition, oropharynx otherwise WNL. No facial asymmetry noted. Cardiovasc:     No JVD. RRR, no MRG. Pulses 2+ and symmetric at distal extremities. Pulmonary:    Coarse rhonchi and wheezing throughout. Improved aeration and decreased wheezing after duoneb tx. Extremities:    No edema, no TTP bilateral calves. No joint effusions. LEs warm and well-perfused. Neuro:    Alert and oriented, CNs II-XII intact. tremor of BUE. No asterixis. Skin:     No rashes noted. Psych:    pleasant, and conversant. Affect, mood & judgment appropriate. Recent Labs & Imaging:     XR Results (most recent):    Results from Appointment encounter on 05/09/17   XR CHEST PA LAT   Narrative EXAM: Two-view chest    CLINICAL HISTORY: productive cough ,    COMPARISON: None    FINDINGS:    Frontal and lateral views of the chest demonstrate clear lungs. Cardiac  silhouette is normal in size and contour. No acute bony or soft tissue  abnormality. Impression IMPRESSION:    No acute pulmonary process identified.

## 2017-05-19 DIAGNOSIS — E55.9 VITAMIN D DEFICIENCY: ICD-10-CM

## 2017-05-19 RX ORDER — ERGOCALCIFEROL 1.25 MG/1
CAPSULE ORAL
Qty: 24 CAP | Refills: 0 | Status: SHIPPED | OUTPATIENT
Start: 2017-05-19 | End: 2019-06-04

## 2017-05-20 PROBLEM — Z91.199 NONCOMPLIANCE: Status: ACTIVE | Noted: 2017-05-20

## 2017-07-28 DIAGNOSIS — M54.42 CHRONIC LEFT-SIDED LOW BACK PAIN WITH LEFT-SIDED SCIATICA: ICD-10-CM

## 2017-07-28 DIAGNOSIS — G89.29 CHRONIC LEFT-SIDED LOW BACK PAIN WITH LEFT-SIDED SCIATICA: ICD-10-CM

## 2017-07-30 RX ORDER — CARISOPRODOL 250 MG/1
250 TABLET ORAL
Qty: 90 TAB | Refills: 2 | OUTPATIENT
Start: 2017-07-30

## 2017-08-11 ENCOUNTER — OFFICE VISIT (OUTPATIENT)
Dept: FAMILY MEDICINE CLINIC | Age: 57
End: 2017-08-11

## 2017-08-11 VITALS
RESPIRATION RATE: 22 BRPM | HEART RATE: 75 BPM | WEIGHT: 97 LBS | DIASTOLIC BLOOD PRESSURE: 78 MMHG | BODY MASS INDEX: 17.19 KG/M2 | TEMPERATURE: 98.1 F | OXYGEN SATURATION: 98 % | SYSTOLIC BLOOD PRESSURE: 110 MMHG | HEIGHT: 63 IN

## 2017-08-11 DIAGNOSIS — J44.1 COPD EXACERBATION (HCC): Primary | ICD-10-CM

## 2017-08-11 RX ORDER — METHYLPREDNISOLONE 4 MG/1
TABLET ORAL
Qty: 1 DOSE PACK | Refills: 0 | Status: SHIPPED | OUTPATIENT
Start: 2017-08-11 | End: 2019-06-04

## 2017-08-11 RX ORDER — AZITHROMYCIN 250 MG/1
TABLET, FILM COATED ORAL
Qty: 6 TAB | Refills: 0 | Status: SHIPPED | OUTPATIENT
Start: 2017-08-11 | End: 2017-08-16

## 2017-08-11 NOTE — MR AVS SNAPSHOT
Visit Information Date & Time Provider Department Dept. Phone Encounter #  
 8/11/2017 10:30 AM Itz Zamorano MD [employer] [phone] [id number] Your Appointments 8/18/2017  1:50 PM  
Follow Up with Reyna Page MD  
[employer] [facility]) Appt Note: 3 month f/u appt; pt r/s  
 [address] [place] [id number] [phone]  
  
   
 [address] [facility] Upcoming Health Maintenance Date Due FOBT Q 1 YEAR AGE 50-75 2/12/2017 INFLUENZA AGE 9 TO ADULT 8/1/2017 DTaP/Tdap/Td series (2 - Td) 11/21/2025 Allergies as of 8/11/2017  Review Complete On: 8/11/2017 By: Itz Zamorano MD  
 No Known Allergies Current Immunizations  Reviewed on 2/2/2017 Name Date Influenza Vaccine 12/14/2014 Influenza Vaccine (Quad) PF 3/2/2017 10:43 AM  
 Pneumococcal Polysaccharide (PPSV-23) 1/8/2015 Tdap 11/21/2015 Not reviewed this visit You Were Diagnosed With   
  
 Codes Comments [facility] exacerb[facility] ([facility]    -  Primary ICD-10-CM: J44.1 ICD-9-CM: 491.21 Vitals BP Pulse Temp Resp Height(growth percentile) Weight(growth percentile) 110/78 (BP 1 Location: Left arm, BP Patient Position: Sitting) 75 98.1 °F (36.7 °C) (Oral) 22 5' 3\" (1.6 m) 97 lb (44 kg) SpO2 BMI Smoking Status 98% 17.18 kg/m2 Former Smoker BMI and BSA Data Body Mass Index Body Surface Area  
 17.18 kg/m 2 1.4 m 2 Preferred Pharmacy Pharmacy Name Phone [address], [address] Your Updated Medication List  
  
   
This list is accurate as of: 8/11/17 10:48 AM.  Always use your most recent med list.  
  
  
  
  
 albuterol 90 mcg/actuation inhaler Commonly known as:  PROVENTIL HFA, VENTOLIN HFA, PROAIR HFA  
 Take 1-2 Puffs by inhalation every four (4) hours as needed for Wheezing or Shortness of Breath. azithromycin 250 mg tablet Commonly known as:  Mara Caller Take 2 tablets today, then take 1 tablet daily  
  
 budesonide-formoterol 80-4.5 mcg/actuation Hfaa inhaler Commonly known as:  SYMBICORT Take 2 Puffs by inhalation two (2) times a day. carisoprodol 250 mg tablet Commonly known as:  SOMA Take 1 Tab by mouth every eight (8) hours as needed for Muscle Spasm(s). Max Daily Amount: 750 mg.  
  
 cloNIDine HCl 0.2 mg tablet Commonly known as:  CATAPRES  
take 1 tablet by mouth twice a day  
  
 methylPREDNISolone 4 mg tablet Commonly known as:  Tra Baker Follow dose pack instructions VITAMIN D2 50,000 unit capsule Generic drug:  ergocalciferol  
take 1 capsule by mouth every [address] Prescriptions Sent to Pharmacy Refills  
 azithromycin (ZITHROMAX) 250 mg tablet 0 Sig: Take 2 tablets today, then take 1 tablet daily Class: Normal  
 Pharmacy: [address] [facility] Ph #: [phone]  
 methylPREDNISolone (MEDROL DOSEPACK) 4 mg tablet 0 Sig: Follow dose pack instructions Class: Normal  
 Pharmacy: [address], [address] [facility] Ph #: [phone] Introducing [facility]! [facility] introduces [email or web address] patient portal. Now you can access parts of your medical record, email your doctor's office, and request medication refills online. 1. In your internet browser, go to [email or web address] 2. Click on the First Time User? Click Here link in the Sign In box. You will see the New Member Sign Up page. 3. Enter your [email or web address] Access Code exactly as it appears below. You will not need to use this code after youve completed the sign-up process. If you do not sign up before the expiration date, you must request a new code. · [employer] Access Code: [email or web address] Expires: 11/9/2017 10:48 AM 
 
4. Enter the last four digits of your Social Security Number (xxxx) and Date of Birth (mm/dd/yyyy) as indicated and click Submit. You will be taken to the next sign-up page. 5. Create a [employer] ID. This will be your [employer] login ID and cannot be changed, so think of one that is secure and easy to remember. 6. Create a [employer] password. You can change your password at any time. 7. Enter your Password Reset Question and Answer. This can be used at a later time if you forget your password. 8. Enter your e-mail address. You will receive e-mail notification when new information is available in [address] 9. Click Sign Up. You can now view and download portions of your medical record. 10. Click the Download Summary menu link to download a portable copy of your medical information. If you have questions, please visit the Frequently Asked Questions section of the [employer] website. Remember, [employer] is NOT to be used for urgent needs. For medical emergencies, dial 911. Now available from your iPhone and Android! Please provide this summary of care documentation to your next provider. Your primary care clinician is listed as Bennie Guevara. If you have any questions after today's visit, please call [phone].

## 2017-08-11 NOTE — PROGRESS NOTES
HISTORY OF PRESENT ILLNESS  Dori Yepez is a 60 y.o. male. Cough   The history is provided by the patient and medical records. This is a recurrent problem. Episode onset: this episode started about a week ago. Associated symptoms include shortness of breath. 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    Abnormal skull or facial bone diagnostic imaging R93.0    Noncompliance Z91.19       Current Outpatient Prescriptions:     cloNIDine HCl (CATAPRES) 0.2 mg tablet, take 1 tablet by mouth twice a day, Disp: 60 Tab, Rfl: 5    VITAMIN D2 50,000 unit capsule, take 1 capsule by mouth every TUESDAY AND FRIDAY, Disp: 24 Cap, Rfl: 0    budesonide-formoterol (SYMBICORT) 80-4.5 mcg/actuation HFAA inhaler, Take 2 Puffs by inhalation two (2) times a day., Disp: 1 Inhaler, Rfl: 5    albuterol (PROVENTIL HFA, VENTOLIN HFA, PROAIR HFA) 90 mcg/actuation inhaler, Take 1-2 Puffs by inhalation every four (4) hours as needed for Wheezing or Shortness of Breath., Disp: 1 Inhaler, Rfl: 5    carisoprodol (SOMA) 250 mg tablet, Take 1 Tab by mouth every eight (8) hours as needed for Muscle Spasm(s). Max Daily Amount: 750 mg., Disp: 90 Tab, Rfl: 2      Review of Systems   Constitutional: Positive for fever (101 2 days ago). Respiratory: Positive for cough, sputum production (yellow), shortness of breath and wheezing. Visit Vitals    /78 (BP 1 Location: Left arm, BP Patient Position: Sitting)    Pulse 75    Temp 98.1 °F (36.7 °C) (Oral)    Resp 22    Ht 5' 3\" (1.6 m)    Wt 97 lb (44 kg)    SpO2 98%    BMI 17.18 kg/m2     Physical Exam   Constitutional: He is oriented to person, place, and time. He appears well-developed and well-nourished.    HENT: Head: Normocephalic. Right Ear: Tympanic membrane and ear canal normal.   Left Ear: Tympanic membrane and ear canal normal.   Mouth/Throat: Oropharynx is clear and moist.   Eyes: Conjunctivae and EOM are normal.   Neck: Neck supple. Cardiovascular: Normal rate, regular rhythm and normal heart sounds. Pulmonary/Chest: Effort normal. No respiratory distress. He has wheezes (mild expiratory wheezes). Lymphadenopathy:     He has no cervical adenopathy. Neurological: He is alert and oriented to person, place, and time. Skin: Skin is warm and dry. Psychiatric: He has a normal mood and affect. His behavior is normal.   Nursing note and vitals reviewed. ASSESSMENT and PLAN    ICD-10-CM ICD-9-CM    1. COPD exacerbation ([facility]) J44.1 491.21 azithromycin (ZITHROMAX) 250 mg tablet      methylPREDNISolone (MEDROL DOSEPACK) 4 mg tablet   Complete prescribed course of antibiotics. Follow Medrol dose pack instructions. Continue current medications including home nebulizer treat ments  Follow up with Dr. Chai Perkins for new symptoms, worsening symptoms or failure to improve.

## 2017-08-11 NOTE — ACP (ADVANCE CARE PLANNING)
Mikaela Og is a 60 y.o. male here for cold symptoms      1. Have you been to the ER, urgent care clinic or hospitalized since your last visit? NO.     2. Have you seen or consulted any other health care providers outside of the [address] [place] [address] since your last visit (Include any pap smears or colon screening)? NO      Do you have an Advanced Directive? NO    Would you like information on Advanced Directives?  NO

## 2017-09-08 ENCOUNTER — TELEPHONE (OUTPATIENT)
Dept: FAMILY MEDICINE CLINIC | Age: 57
End: 2017-09-08

## 2017-09-08 NOTE — TELEPHONE ENCOUNTER
Patient called and left message today at 11:47 am stating that:    \"Dr. Danny Medina needs to call a neurologist, I need to see a neurologist doctor immediately. Left side today is just about paralyzed where I cant even stand more or less I cant even stand up straight now with my lower back. This pain and this ___  ok let me give you my symptoms that I am experiencing now from this injury that took place in 2010. Today I have got;     · Bowel dis-function  · Sexual dis-function  · Nerve pain  · Pain in lower back, buttocks, legs & feet. · Pain from coughing & sneezing in lower back  · Weakness, crampiness, numbness in my buttocks, legs & feet. · Abnormal in legs I cant hardly stand because of this. · Im having headaches from all this. · Mari got burning in my tailbone. · I cant even eat hardly today. I need Dr. Danny Medina to get involved immediately and get me to a neurologist. Im at the point where Im going to the hospital. I came in last month to see this women about all this. I had an issue about my insurance but for now she is being negligent you need to have her set me up with a neurologist tomorrow. \"    Patient left his number then hung up. Per my referral notes referral and information was sent to office below on 3/8/17 @ 10:18 am by myself. [facility]   [address]  Phone # [phone]  Fax # [phone]    Then on 4/19/17 Gato Rebolledo sent a fax to them requesting appointment/office notes. On 4/25/17 @ 11:54 am Gato Rebolledo changed appointment status to scheduled with a date of 5/4/17 @ 1:45 pm w/ Dr. Geno Doshi. Patient ended up no showing the appointment, per note from appointment with Dr. Danny Medina on 5/9/17. See below:    Notes from your doctor:  · In order to improve upon your health, you need to make and keep your specialist appointments.   Because you have \"no showed\" for both specialists, we will require that you call to make your own appointments with them. [facility]   Patient no showed on May 4th at 1:45pm.  [address], [address]  Phone # [phone]  Fax # [phone]        [facility] Patient no showed for appointment. [address]   Phone # [phone]  Fax # [phone]    As for appointment information with us he was last seen on 5/9/17 by Dr. Sepideh Beckham. At that appointment he schedule a 3 month follow up for 8/9/17 which he called and rescheduled on 8/7/17 for the 8/18/17. When he arrived for the appointment on 8/18/17 Dr. Sepideh Beckham was running a little behind and patient decided to leave. Patient was also scheduled for an appointment with Dr. Sepideh Beckham on 8/25/17 which he canceled on automated reminder system.

## 2017-09-28 DIAGNOSIS — J42 CHRONIC BRONCHITIS, UNSPECIFIED CHRONIC BRONCHITIS TYPE (HCC): Chronic | ICD-10-CM

## 2017-09-28 NOTE — TELEPHONE ENCOUNTER
Pharmacy called for the following refills:    Pharmacy has on file Symbicort 160-4.5, 2 puffs ,2 times a day. Duoneb (albuterol and ipratropium) 3ml vials, 0.5 impratropium and 3mg albuterol.

## 2017-09-29 RX ORDER — IPRATROPIUM BROMIDE AND ALBUTEROL SULFATE 2.5; .5 MG/3ML; MG/3ML
3 SOLUTION RESPIRATORY (INHALATION)
Qty: 30 NEBULE | Refills: 1 | Status: SHIPPED | OUTPATIENT
Start: 2017-09-29

## 2017-09-29 RX ORDER — BUDESONIDE AND FORMOTEROL FUMARATE DIHYDRATE 80; 4.5 UG/1; UG/1
2 AEROSOL RESPIRATORY (INHALATION) 2 TIMES DAILY
Qty: 1 INHALER | Refills: 5 | Status: SHIPPED | OUTPATIENT
Start: 2017-09-29

## 2017-09-29 NOTE — TELEPHONE ENCOUNTER
Last seen 8/11/17 by Dr. Bianka Norman    Last seen 5/9/17 by Dr. Charlene Thayer last filled 5/9/17 qty 1 w/ 5 refills    Do not have on file patient taking Duoneb, Impratropim nor Albuterol neb.

## 2017-09-29 NOTE — TELEPHONE ENCOUNTER
Orders Placed This Encounter    budesonide-formoterol (SYMBICORT) 80-4.5 mcg/actuation HFAA     Sig: Take 2 Puffs by inhalation two (2) times a day. Dispense:  1 Inhaler     Refill:  5    albuterol-ipratropium (DUO-NEB) 2.5 mg-0.5 mg/3 ml nebu     Sig: 3 mL by Nebulization route every six (6) hours as needed.      Dispense:  30 Nebule     Refill:  1

## 2018-01-22 RX ORDER — CLONIDINE HYDROCHLORIDE 0.2 MG/1
TABLET ORAL
Qty: 60 TAB | Refills: 1 | Status: SHIPPED | OUTPATIENT
Start: 2018-01-22

## 2018-01-22 NOTE — TELEPHONE ENCOUNTER
Pt called stating that he has been having trouble finding a primary care provider and he is out of his bp  medication  And needs it. I informed pt that he was discharged 9/8/17 and would no longer be her PCP after 10/9/17 so there was no guarantee that she would fill it. Pt became rude and argumentative stating that he needs his medication and that \"legaly she can't refuse\". Please advise.